# Patient Record
Sex: FEMALE | Race: WHITE | NOT HISPANIC OR LATINO | Employment: OTHER | URBAN - NONMETROPOLITAN AREA
[De-identification: names, ages, dates, MRNs, and addresses within clinical notes are randomized per-mention and may not be internally consistent; named-entity substitution may affect disease eponyms.]

---

## 2022-04-18 ENCOUNTER — OFFICE VISIT (OUTPATIENT)
Dept: FAMILY MEDICINE CLINIC | Facility: CLINIC | Age: 52
End: 2022-04-18

## 2022-04-18 VITALS
HEART RATE: 100 BPM | DIASTOLIC BLOOD PRESSURE: 75 MMHG | WEIGHT: 140.8 LBS | BODY MASS INDEX: 28.39 KG/M2 | HEIGHT: 59 IN | OXYGEN SATURATION: 99 % | SYSTOLIC BLOOD PRESSURE: 112 MMHG

## 2022-04-18 DIAGNOSIS — F33.0 MAJOR DEPRESSIVE DISORDER, RECURRENT, MILD: ICD-10-CM

## 2022-04-18 DIAGNOSIS — M25.551 RIGHT HIP PAIN: Primary | ICD-10-CM

## 2022-04-18 PROCEDURE — 99203 OFFICE O/P NEW LOW 30 MIN: CPT | Performed by: FAMILY MEDICINE

## 2022-04-18 RX ORDER — METHYLPREDNISOLONE 4 MG/1
4 TABLET ORAL DAILY
Qty: 7 TABLET | Refills: 0 | Status: SHIPPED | OUTPATIENT
Start: 2022-04-18 | End: 2022-09-19

## 2022-04-18 NOTE — PROGRESS NOTES
"Chief Complaint  Right hip pain (Fell out of bed a week ago)    Subjective          Agustina Taylor presents to [facility]  History of Present Illness    Objective   Vital Signs:   /75   Pulse 100   Ht 149.9 cm (59\")   Wt 63.9 kg (140 lb 12.8 oz)   SpO2 99%   BMI 28.44 kg/m²     Body mass index is 28.44 kg/m².    Review of Systems   Constitutional: Negative.    HENT: Negative for congestion, dental problem, ear discharge, ear pain and sore throat.    Respiratory: Negative for apnea, chest tightness and shortness of breath.    Gastrointestinal: Negative for constipation and nausea.   Endocrine: Negative for polyuria.   Genitourinary: Negative for difficulty urinating.   Musculoskeletal: Positive for arthralgias. Negative for gait problem.   Skin: Negative for rash.   Hematological: Negative for adenopathy.   Psychiatric/Behavioral: Positive for depressed mood and stress. The patient is nervous/anxious.        Past History:  Medical History: has a past medical history of Depression.   Surgical History: has no past surgical history on file.         Current Outpatient Medications:   •  Brexpiprazole (REXULTI PO), Take  by mouth., Disp: , Rfl:   •  LAMOTRIGINE PO, Take 100 mg by mouth Daily., Disp: , Rfl:   •  QUEtiapine Fumarate (SEROQUEL PO), Take  by mouth., Disp: , Rfl:   •  methylPREDNISolone (MEDROL) 4 MG tablet, Take 1 tablet by mouth Daily., Disp: 7 tablet, Rfl: 0    Allergies: Aspirin and Vraylar [cariprazine]    Physical Exam  Vitals reviewed.   Constitutional:       Appearance: Normal appearance.   HENT:      Head: Normocephalic and atraumatic.      Right Ear: Tympanic membrane, ear canal and external ear normal.      Left Ear: Tympanic membrane, ear canal and external ear normal.      Nose: Nose normal.      Mouth/Throat:      Mouth: Mucous membranes are moist.   Eyes:      Extraocular Movements: Extraocular movements intact.      Conjunctiva/sclera: Conjunctivae " normal.      Pupils: Pupils are equal, round, and reactive to light.   Cardiovascular:      Rate and Rhythm: Normal rate and regular rhythm.   Pulmonary:      Effort: Pulmonary effort is normal.      Breath sounds: Normal breath sounds.   Abdominal:      General: Abdomen is flat. Bowel sounds are normal.      Palpations: Abdomen is soft.   Musculoskeletal:         General: Normal range of motion.      Comments: Point tenderness over the right hip lateral range of motion and flexion of the hip is otherwise normal   Feet:      Right foot:      Protective Sensation: 0 sites tested. 0 sites sensed.      Toenail Condition: Right toenails are normal.      Left foot:      Protective Sensation: 0 sites tested. 0 sites sensed.      Toenail Condition: Left toenails are normal.   Skin:     General: Skin is warm and dry.   Neurological:      General: No focal deficit present.      Mental Status: She is alert and oriented to person, place, and time. Mental status is at baseline.   Psychiatric:         Behavior: Behavior normal.         Thought Content: Thought content normal.         Judgment: Judgment normal.          Result Review :                   Assessment and Plan    Diagnoses and all orders for this visit:    1. Right hip pain (Primary)  Comments:  We will get discussed the treatment ice rest and Medrol does not appear she has a fracture but offered x-ray which she does not want    2. Major depressive disorder, recurrent, mild (HCC)  Comments:  Continues on present medication states her son  2 years ago and she had a hard time with that.  Has been seeing psych    Other orders  -     methylPREDNISolone (MEDROL) 4 MG tablet; Take 1 tablet by mouth Daily.  Dispense: 7 tablet; Refill: 0              Follow Up   No follow-ups on file.  Patient was given instructions and counseling regarding her condition or for health maintenance advice. Please see specific information pulled into the AVS if appropriate.     Saturnino  MD Becky

## 2022-09-19 ENCOUNTER — OFFICE VISIT (OUTPATIENT)
Dept: FAMILY MEDICINE CLINIC | Facility: CLINIC | Age: 52
End: 2022-09-19

## 2022-09-19 VITALS
OXYGEN SATURATION: 98 % | BODY MASS INDEX: 29.72 KG/M2 | SYSTOLIC BLOOD PRESSURE: 160 MMHG | DIASTOLIC BLOOD PRESSURE: 100 MMHG | WEIGHT: 147.4 LBS | HEART RATE: 105 BPM | HEIGHT: 59 IN

## 2022-09-19 DIAGNOSIS — I73.9 PVD (PERIPHERAL VASCULAR DISEASE): ICD-10-CM

## 2022-09-19 DIAGNOSIS — I10 PRIMARY HYPERTENSION: Primary | ICD-10-CM

## 2022-09-19 PROCEDURE — 99214 OFFICE O/P EST MOD 30 MIN: CPT | Performed by: FAMILY MEDICINE

## 2022-09-19 RX ORDER — HYDROXYZINE PAMOATE 50 MG/1
50 CAPSULE ORAL 3 TIMES DAILY PRN
COMMUNITY
Start: 2022-08-21

## 2022-09-19 RX ORDER — VALSARTAN 160 MG/1
160 TABLET ORAL DAILY
Qty: 30 TABLET | Refills: 2 | Status: SHIPPED | OUTPATIENT
Start: 2022-09-19 | End: 2022-10-10

## 2022-09-19 RX ORDER — MIRTAZAPINE 15 MG/1
15 TABLET, FILM COATED ORAL NIGHTLY
COMMUNITY

## 2022-09-19 RX ORDER — BUSPIRONE HYDROCHLORIDE 30 MG/1
30 TABLET ORAL 2 TIMES DAILY
COMMUNITY
Start: 2022-09-09

## 2022-09-19 RX ORDER — CYCLOBENZAPRINE HCL 10 MG
10 TABLET ORAL 3 TIMES DAILY PRN
COMMUNITY
Start: 2022-08-12

## 2022-09-19 RX ORDER — LAMOTRIGINE 200 MG/1
200 TABLET ORAL 2 TIMES DAILY
COMMUNITY
Start: 2022-09-09

## 2022-10-10 ENCOUNTER — OFFICE VISIT (OUTPATIENT)
Dept: FAMILY MEDICINE CLINIC | Facility: CLINIC | Age: 52
End: 2022-10-10

## 2022-10-10 VITALS
OXYGEN SATURATION: 99 % | HEIGHT: 59 IN | SYSTOLIC BLOOD PRESSURE: 160 MMHG | BODY MASS INDEX: 31.27 KG/M2 | HEART RATE: 88 BPM | DIASTOLIC BLOOD PRESSURE: 95 MMHG | WEIGHT: 155.1 LBS

## 2022-10-10 DIAGNOSIS — I10 PRIMARY HYPERTENSION: Primary | ICD-10-CM

## 2022-10-10 PROCEDURE — 99213 OFFICE O/P EST LOW 20 MIN: CPT | Performed by: FAMILY MEDICINE

## 2022-10-10 RX ORDER — VALSARTAN 320 MG/1
320 TABLET ORAL DAILY
Qty: 30 TABLET | Refills: 3 | Status: SHIPPED | OUTPATIENT
Start: 2022-10-10 | End: 2022-11-08 | Stop reason: SDUPTHER

## 2022-10-10 NOTE — PROGRESS NOTES
"Chief Complaint  Hypertension    Subjective          Agustina Taylor presents to [facility]  History of Present Illness  Patient comes in says her blood pressure still been running high at home she has been monitoring and she says been get between 160 and 200 at home on the top she says the bottom numbers been running about  she says she has had no problem with medications continues taking it denies any activities at this time  Hypertension  Pertinent negatives include no shortness of breath.       Objective   Vital Signs:   /95   Pulse 88   Ht 149.9 cm (59.02\")   Wt 70.4 kg (155 lb 1.6 oz)   SpO2 99%   BMI 31.31 kg/m²     Body mass index is 31.31 kg/m².    Review of Systems   Constitutional: Negative.    HENT: Negative for congestion, dental problem, ear discharge, ear pain and sore throat.    Respiratory: Negative for apnea, chest tightness and shortness of breath.    Gastrointestinal: Negative for constipation and nausea.   Endocrine: Negative for polyuria.   Genitourinary: Negative for difficulty urinating.   Musculoskeletal: Positive for back pain. Negative for arthralgias and gait problem.   Skin: Negative for rash.   Hematological: Negative for adenopathy.       Past History:  Medical History: has a past medical history of Depression.   Surgical History: has no past surgical history on file.         Current Outpatient Medications:   •  Brexpiprazole (REXULTI PO), Take  by mouth., Disp: , Rfl:   •  busPIRone (BUSPAR) 30 MG tablet, Take 30 mg by mouth 2 (Two) Times a Day., Disp: , Rfl:   •  cyclobenzaprine (FLEXERIL) 10 MG tablet, Take 10 mg by mouth 3 (Three) Times a Day As Needed. for muscle spams, Disp: , Rfl:   •  hydrOXYzine pamoate (VISTARIL) 50 MG capsule, Take 50 mg by mouth 3 (Three) Times a Day As Needed., Disp: , Rfl:   •  lamoTRIgine (LaMICtal) 200 MG tablet, Take 200 mg by mouth 2 (Two) Times a Day., Disp: , Rfl:   •  mirtazapine (REMERON) 15 MG " tablet, Take 15 mg by mouth Every Night., Disp: , Rfl:   •  valsartan (DIOVAN) 320 MG tablet, Take 1 tablet by mouth Daily., Disp: 30 tablet, Rfl: 3    Allergies: Aspirin and Vraylar [cariprazine]    Physical Exam  Vitals reviewed.   Constitutional:       Appearance: Normal appearance.   HENT:      Head: Normocephalic and atraumatic.      Right Ear: Tympanic membrane, ear canal and external ear normal.      Left Ear: Tympanic membrane, ear canal and external ear normal.      Nose: Nose normal.      Mouth/Throat:      Mouth: Mucous membranes are moist.   Eyes:      Extraocular Movements: Extraocular movements intact.      Conjunctiva/sclera: Conjunctivae normal.      Pupils: Pupils are equal, round, and reactive to light.   Cardiovascular:      Rate and Rhythm: Normal rate and regular rhythm.   Pulmonary:      Effort: Pulmonary effort is normal.      Breath sounds: Normal breath sounds.   Abdominal:      General: Abdomen is flat. Bowel sounds are normal.      Palpations: Abdomen is soft.   Musculoskeletal:         General: Normal range of motion.   Feet:      Right foot:      Protective Sensation: 0 sites tested. 0 sites sensed.      Toenail Condition: Right toenails are normal.      Left foot:      Protective Sensation: 0 sites tested. 0 sites sensed.      Toenail Condition: Left toenails are normal.   Skin:     General: Skin is warm and dry.   Neurological:      General: No focal deficit present.      Mental Status: She is alert and oriented to person, place, and time. Mental status is at baseline.   Psychiatric:         Behavior: Behavior normal.         Thought Content: Thought content normal.         Judgment: Judgment normal.          Result Review :                   Assessment and Plan    Diagnoses and all orders for this visit:    1. Primary hypertension (Primary)  Comments:  Recheck blood pressure was 155/90 patient says it been running between about 150 and about 200 at home on the top she says its not  come down she says but she is    Other orders  -     valsartan (DIOVAN) 320 MG tablet; Take 1 tablet by mouth Daily.  Dispense: 30 tablet; Refill: 3              Follow Up   Return in about 4 weeks (around 11/7/2022).  Patient was given instructions and counseling regarding her condition or for health maintenance advice. Please see specific information pulled into the AVS if appropriate.     Saturnino Pena MD

## 2022-10-24 ENCOUNTER — OFFICE VISIT (OUTPATIENT)
Dept: CARDIOLOGY | Facility: CLINIC | Age: 52
End: 2022-10-24

## 2022-10-24 VITALS
SYSTOLIC BLOOD PRESSURE: 130 MMHG | HEART RATE: 78 BPM | RESPIRATION RATE: 18 BRPM | BODY MASS INDEX: 30.04 KG/M2 | OXYGEN SATURATION: 98 % | WEIGHT: 149 LBS | DIASTOLIC BLOOD PRESSURE: 74 MMHG | HEIGHT: 59 IN | TEMPERATURE: 97.7 F

## 2022-10-24 DIAGNOSIS — I73.9 INTERMITTENT CLAUDICATION: ICD-10-CM

## 2022-10-24 DIAGNOSIS — Z86.73 HISTORY OF STROKE: ICD-10-CM

## 2022-10-24 DIAGNOSIS — R42 DIZZINESS: ICD-10-CM

## 2022-10-24 DIAGNOSIS — Z72.0 TOBACCO USE: ICD-10-CM

## 2022-10-24 DIAGNOSIS — I10 ESSENTIAL HYPERTENSION: Primary | ICD-10-CM

## 2022-10-24 DIAGNOSIS — I65.29 STENOSIS OF CAROTID ARTERY, UNSPECIFIED LATERALITY: ICD-10-CM

## 2022-10-24 PROCEDURE — 93000 ELECTROCARDIOGRAM COMPLETE: CPT

## 2022-10-24 PROCEDURE — 99204 OFFICE O/P NEW MOD 45 MIN: CPT

## 2022-10-24 RX ORDER — CLOPIDOGREL BISULFATE 75 MG/1
75 TABLET ORAL DAILY
Qty: 90 TABLET | Refills: 0 | Status: SHIPPED | OUTPATIENT
Start: 2022-10-24 | End: 2023-01-16 | Stop reason: SDUPTHER

## 2022-10-24 RX ORDER — PANTOPRAZOLE SODIUM 40 MG/1
40 TABLET, DELAYED RELEASE ORAL DAILY
COMMUNITY

## 2022-10-24 RX ORDER — TRAZODONE HYDROCHLORIDE 50 MG/1
50 TABLET ORAL NIGHTLY
COMMUNITY

## 2022-10-24 NOTE — PROGRESS NOTES
"[facility]  [facility]OLOG[facility]  [address] [place] [address]  Dept: [phone]  Dept Fax: [phone]    Agustina Taylor  1970    New Patient Office Note    History of Present Illness:  Agustina Taylor is a 52 y.o. female who presents to the clinic for Establish Care and Dizziness. She has PMH significant for Stroke 2012, carotid stenosis, HTN. She is single, 2 kids one , disabled. She is current every day smoker 1-2 ppd x 25 yrs, denies alcohol, hx IVDA sober now 2 yrs. Rare caffeine use. Regular diet, denies exercise. Negative family history. She presents today as self referral to establish care and for recent 3 week history of dizziness \"off balance feeling\" primarily with moving, standing, also sometimes at rest, but states if when standing improves with resting. Also has had constant headache, recent hypertension diagnosis, was started on Valsartan Sept and increased to 360 mg qd recently last 2 weeks, BP today still elevated 140/82 right arm and 140/80 left arm, negative orthostatics 138/70 standing. Ekg today NSR, Hr 73, nonspecific ST abnormalities, no change from . She has stroke hx, she states around  effecting right side, unknown etiology per patient she states in [facility], [facility] try to retrieve record. She denies c/o CP, SOB, fatigue, palpitations, orthopnea, PND, + PND, + snoring and no formal sleep study. Additionally she states her legs do hurt, cramp at night, also cramping/pain while walking which improves with rest, She is smoker x 25 yrs. PE today reveals bilateral mild carotid bruit, trace LE, mildly elevated BP. Will gets labs advised to come fasting this week, Echo for recent HTN dx, VELIA for claudication, smoker and know WILLIAMS, also carotid duplex ordered by PCP but not yet scheduled, advised to keep that appointment. Will start Plavix for WILLIAMS, as she is allergic to ASA. Advised to keep home BP log included in AVS, low salt " "diet. Will follow 3 weeks or sooner with new, worsening complaints.     The following portions of the patient's history were reviewed and updated as appropriate: allergies, current medications, past family history, past medical history, past social history, past surgical history, and problem list.    Medications:  busPIRone  cyclobenzaprine  hydrOXYzine pamoate  lamoTRIgine  mirtazapine  pantoprazole  REXULTI PO  traZODone  valsartan    Subjective  Allergies   Allergen Reactions   • Aspirin Anaphylaxis   • Vraylar [Cariprazine] Hives        Past Medical History:   Diagnosis Date   • Depression        History reviewed. No pertinent surgical history.    History reviewed. No pertinent family history.     Social History     Socioeconomic History   • Marital status: Single   Tobacco Use   • Smoking status: Every Day   • Smokeless tobacco: Never   Vaping Use   • Vaping Use: Never used   Substance and Sexual Activity   • Alcohol use: Defer   • Drug use: Defer     Comment: recovering   • Sexual activity: Defer       Review of Systems   Neurological: Positive for dizziness, light-headedness and headache.   All other systems reviewed and are negative.      Cardiovascular Procedures    ECHO/MUGA:   STRESS TESTS:   CARDIAC CATH:   DEVICES:   HOLTER:   CT/MRI:   VASCULAR:   CARDIOTHORACIC:     Objective  Vitals:    10/24/22 1252   BP: 130/74   BP Location: Right leg   Patient Position: Sitting   Cuff Size: Adult   Pulse: 78   Resp: 18   Temp: 97.7 °F (36.5 °C)   TempSrc: Infrared   SpO2: 98%   Weight: 67.6 kg (149 lb)   Height: 149.9 cm (59\")   PainSc: 0-No pain       Physical Exam  Constitutional:       Appearance: Healthy appearance. Not in distress.   Neck:      Vascular: No JVR. JVD normal.   Pulmonary:      Effort: Pulmonary effort is normal.      Breath sounds: Normal breath sounds. No wheezing. No rhonchi. No rales.   Chest:      Chest wall: Not tender to palpatation.   Cardiovascular:      PMI at left midclavicular line. " Normal rate. Regular rhythm. Normal S1. Normal S2.      Murmurs: There is no murmur.      No gallop. No click. No rub.   Pulses:     Carotid: with bruit bilaterally.  Edema:     Pretibial: bilateral trace edema of the pretibial area.     Ankle: bilateral trace edema of the ankle.     Feet: bilateral trace edema of the feet.  Abdominal:      General: Bowel sounds are normal.      Palpations: Abdomen is soft.      Tenderness: There is no abdominal tenderness.   Musculoskeletal: Normal range of motion.         General: No tenderness. Skin:     General: Skin is warm and dry.   Neurological:      General: No focal deficit present.      Mental Status: Alert and oriented to person, place and time.          Diagnostic Data    ECG 12 Lead    Date/Time: 10/24/2022 2:14 PM  Performed by: Lynn Ridley APRN  Authorized by: Lynn Ridley APRN   Comparison: compared with previous ECG from 3/23/2021  Similar to previous ECG  Rhythm: sinus rhythm  Rate: normal  BPM: 73  QRS axis: normal  Other findings: non-specific ST-T wave changes    Clinical impression: non-specific ECG            Assessment and Plan  Diagnoses and all orders for this visit:    1. Essential hypertension (Primary)  On Valsartan 360 mg qd, increased by PCP about 2 weeks ago, BP today still mild elevation 140/82 on recheck. + HA, dizziness may be attributed to BP. Will observe, advised to keep BP log and may need additional Bp lowering therapy at follow up visit.Hx IVDA, no longer using last 2 yrs. Labs, echo for further evaluation.    -     Adult Transthoracic Echo Complete W/ Cont if Necessary Per Protocol; Future  -     CBC & Differential; Future  -     Comprehensive Metabolic Panel; Future  -     ECG 12 Lead    2. Dizziness  As above. Will get labs, carotid duplex, BP control.   -     Adult Transthoracic Echo Complete W/ Cont if Necessary Per Protocol; Future  -     CBC & Differential; Future  -     Comprehensive Metabolic Panel;  Future  -     ECG 12 Lead    3. Stenosis of carotid artery, unspecified laterality  < 50% bilateral with normal antegrade flow by duplex 9-9-2020. Bilateral bruit. Advised to keep carotid duplex ordered by PCP. No ASA, allergic, will order Plavix 75 mg qd.   -     Lipid Panel; Future  -     CK; Future    4. Tobacco use  Current every day smoker. 1-2 ppd x 25 yrs, has tried Chantix in past but interfered with some other medications. Advised to quit. She is not agreeable at this time.     5. Intermittent claudication (HCC)  C/o claudication bilateral extremities with activity, also has some cramping night time. Smoker, will get VELIA.   -     Doppler Ankle Brachial Index Single Level CAR; Future    6. History of stroke  She states 2012, no records, tried to call [place] for record but they state none there. Unknown etiology. Will try to find records. Start Plavix, also will need to start Statin, needing labs first.           Return in about 3 weeks (around 11/14/2022) for Recheck.    Lynn Ridley, APRN  10/24/2022

## 2022-10-28 ENCOUNTER — LAB (OUTPATIENT)
Dept: CARDIOLOGY | Facility: CLINIC | Age: 52
End: 2022-10-28

## 2022-10-28 DIAGNOSIS — I65.29 STENOSIS OF CAROTID ARTERY, UNSPECIFIED LATERALITY: ICD-10-CM

## 2022-10-28 DIAGNOSIS — I10 ESSENTIAL HYPERTENSION: Primary | ICD-10-CM

## 2022-10-28 DIAGNOSIS — Z72.0 TOBACCO USE: ICD-10-CM

## 2022-10-28 DIAGNOSIS — I73.9 INTERMITTENT CLAUDICATION: ICD-10-CM

## 2022-10-28 DIAGNOSIS — Z86.73 HISTORY OF STROKE: ICD-10-CM

## 2022-10-28 DIAGNOSIS — R42 DIZZINESS: ICD-10-CM

## 2022-10-29 LAB
ALBUMIN SERPL-MCNC: 4.9 G/DL (ref 3.8–4.9)
ALBUMIN/GLOB SERPL: 1.6 {RATIO} (ref 1.2–2.2)
ALP SERPL-CCNC: 123 IU/L (ref 44–121)
ALT SERPL-CCNC: 28 IU/L (ref 0–32)
AST SERPL-CCNC: 21 IU/L (ref 0–40)
BASOPHILS # BLD AUTO: 0.1 X10E3/UL (ref 0–0.2)
BASOPHILS NFR BLD AUTO: 1 %
BILIRUB SERPL-MCNC: 0.4 MG/DL (ref 0–1.2)
BUN SERPL-MCNC: 19 MG/DL (ref 6–24)
BUN/CREAT SERPL: 16 (ref 9–23)
CALCIUM SERPL-MCNC: 10 MG/DL (ref 8.7–10.2)
CHLORIDE SERPL-SCNC: 100 MMOL/L (ref 96–106)
CHOLEST SERPL-MCNC: 209 MG/DL (ref 100–199)
CK SERPL-CCNC: 60 U/L (ref 32–182)
CO2 SERPL-SCNC: 23 MMOL/L (ref 20–29)
CREAT SERPL-MCNC: 1.17 MG/DL (ref 0.57–1)
EGFRCR SERPLBLD CKD-EPI 2021: 55 ML/MIN/1.73
EOSINOPHIL # BLD AUTO: 0.2 X10E3/UL (ref 0–0.4)
EOSINOPHIL NFR BLD AUTO: 2 %
ERYTHROCYTE [DISTWIDTH] IN BLOOD BY AUTOMATED COUNT: 13.8 % (ref 11.7–15.4)
GLOBULIN SER CALC-MCNC: 3.1 G/DL (ref 1.5–4.5)
GLUCOSE SERPL-MCNC: 99 MG/DL (ref 70–99)
HCT VFR BLD AUTO: 43 % (ref 34–46.6)
HDLC SERPL-MCNC: 41 MG/DL
HGB BLD-MCNC: 14.2 G/DL (ref 11.1–15.9)
IMM GRANULOCYTES # BLD AUTO: 0 X10E3/UL (ref 0–0.1)
IMM GRANULOCYTES NFR BLD AUTO: 0 %
LDLC SERPL CALC-MCNC: 136 MG/DL (ref 0–99)
LYMPHOCYTES # BLD AUTO: 3.7 X10E3/UL (ref 0.7–3.1)
LYMPHOCYTES NFR BLD AUTO: 44 %
MCH RBC QN AUTO: 28.4 PG (ref 26.6–33)
MCHC RBC AUTO-ENTMCNC: 33 G/DL (ref 31.5–35.7)
MCV RBC AUTO: 86 FL (ref 79–97)
MONOCYTES # BLD AUTO: 0.7 X10E3/UL (ref 0.1–0.9)
MONOCYTES NFR BLD AUTO: 8 %
NEUTROPHILS # BLD AUTO: 3.8 X10E3/UL (ref 1.4–7)
NEUTROPHILS NFR BLD AUTO: 45 %
PLATELET # BLD AUTO: 431 X10E3/UL (ref 150–450)
POTASSIUM SERPL-SCNC: 4.4 MMOL/L (ref 3.5–5.2)
PROT SERPL-MCNC: 8 G/DL (ref 6–8.5)
RBC # BLD AUTO: 5 X10E6/UL (ref 3.77–5.28)
SODIUM SERPL-SCNC: 138 MMOL/L (ref 134–144)
TRIGL SERPL-MCNC: 178 MG/DL (ref 0–149)
VLDLC SERPL CALC-MCNC: 32 MG/DL (ref 5–40)
WBC # BLD AUTO: 8.5 X10E3/UL (ref 3.4–10.8)

## 2022-11-01 ENCOUNTER — TELEPHONE (OUTPATIENT)
Dept: CARDIOLOGY | Facility: CLINIC | Age: 52
End: 2022-11-01

## 2022-11-01 NOTE — TELEPHONE ENCOUNTER
----- Message from LISSY Melo sent at 10/31/2022  9:37 AM EDT -----  Her cholesterol is elevated  and her ASCVD 10 yr risk score is 10 %. We need to start her on statin therapy. I would recommend Crestor 20 mg qd to start. We can send to her pharmacy. Please let her know in general this medication has the least amount of side effects but if her urine turns dark brown, she has stomach pain, vomiting, with extreme muscle cramping then she should let me know.     Her kidney function is only very mildly elevated. We will continue to watch this. Tell her to make sure she is hydrating well with water.     Looks like she is scheduled for testing tomorrow. I will se her in a couple weeks.

## 2022-11-01 NOTE — TELEPHONE ENCOUNTER
TC to pt and she verbally understood message regarding results and is agreeable to start the Statin therapy and will keep us updated after starting.

## 2022-11-04 ENCOUNTER — PATIENT ROUNDING (BHMG ONLY) (OUTPATIENT)
Dept: CARDIOLOGY | Facility: CLINIC | Age: 52
End: 2022-11-04

## 2022-11-04 NOTE — PROGRESS NOTES
November 4, 2022    Hello, may I speak with Agustina Taylor?    My name is nano    I am  with [facility]  [facility]IOLOG[facility]  [address] [place] [address].    Before we get started may I verify your date of birth? 1970    I am calling to officially welcome you to our practice and ask about your recent visit. Is this a good time to talk? yes    Tell me about your visit with us. What things went well?  everything went well  Very nice people        We're always looking for ways to make our patients' experiences even better. Do you have recommendations on ways we may improve?  no    Overall were you satisfied with your first visit to our practice? yes       I appreciate you taking the time to speak with me today. Is there anything else I can do for you? no      Thank you, and have a great day.

## 2022-11-08 ENCOUNTER — OFFICE VISIT (OUTPATIENT)
Dept: FAMILY MEDICINE CLINIC | Facility: CLINIC | Age: 52
End: 2022-11-08

## 2022-11-08 VITALS
HEIGHT: 59 IN | SYSTOLIC BLOOD PRESSURE: 124 MMHG | WEIGHT: 148 LBS | HEART RATE: 68 BPM | DIASTOLIC BLOOD PRESSURE: 82 MMHG | OXYGEN SATURATION: 97 % | BODY MASS INDEX: 29.84 KG/M2

## 2022-11-08 DIAGNOSIS — F33.0 MAJOR DEPRESSIVE DISORDER, RECURRENT, MILD: ICD-10-CM

## 2022-11-08 DIAGNOSIS — I10 PRIMARY HYPERTENSION: Primary | ICD-10-CM

## 2022-11-08 PROCEDURE — 99213 OFFICE O/P EST LOW 20 MIN: CPT | Performed by: FAMILY MEDICINE

## 2022-11-08 RX ORDER — VALSARTAN 320 MG/1
320 TABLET ORAL DAILY
Qty: 30 TABLET | Refills: 3 | Status: SHIPPED | OUTPATIENT
Start: 2022-11-08 | End: 2023-02-13

## 2022-11-08 NOTE — PROGRESS NOTES
"Chief Complaint  Hypertension (Patient is here for a follow up.)    Subjective          Agustina Taylor presents to [facility]  History of Present Illness  Patient comes in today saying she has been doing okay her blood pressures been much improved with the new medications and says she has had no real problems or difficulties she said her anxiety depression is still somewhat of a problem but she says it is better  Hypertension  Pertinent negatives include no shortness of breath.       Objective   Vital Signs:   /82 (BP Location: Right arm, Patient Position: Sitting, Cuff Size: Adult)   Pulse 68   Ht 149.9 cm (59\")   Wt 67.1 kg (148 lb)   SpO2 97%   BMI 29.89 kg/m²     Body mass index is 29.89 kg/m².    Review of Systems   Constitutional: Negative.    HENT: Negative for congestion, dental problem, ear discharge, ear pain and sore throat.    Respiratory: Negative for apnea, chest tightness and shortness of breath.    Gastrointestinal: Negative for constipation and nausea.   Endocrine: Negative for polyuria.   Genitourinary: Negative for difficulty urinating.   Musculoskeletal: Negative for arthralgias and gait problem.   Skin: Negative for rash.   Hematological: Negative for adenopathy.   Psychiatric/Behavioral: Positive for depressed mood. The patient is nervous/anxious.        Past History:  Medical History: has a past medical history of Depression and Hypertension.   Surgical History: has no past surgical history on file.         Current Outpatient Medications:   •  Brexpiprazole (REXULTI PO), Take  by mouth., Disp: , Rfl:   •  busPIRone (BUSPAR) 30 MG tablet, Take 30 mg by mouth 2 (Two) Times a Day., Disp: , Rfl:   •  clopidogrel (Plavix) 75 MG tablet, Take 1 tablet by mouth Daily., Disp: 90 tablet, Rfl: 0  •  hydrOXYzine pamoate (VISTARIL) 50 MG capsule, Take 50 mg by mouth 3 (Three) Times a Day As Needed., Disp: , Rfl:   •  lamoTRIgine (LaMICtal) 200 MG tablet, Take 200 " mg by mouth 2 (Two) Times a Day., Disp: , Rfl:   •  mirtazapine (REMERON) 15 MG tablet, Take 15 mg by mouth Every Night., Disp: , Rfl:   •  pantoprazole (PROTONIX) 40 MG EC tablet, Take 1 tablet by mouth Daily., Disp: , Rfl:   •  traZODone (DESYREL) 50 MG tablet, Take 1 tablet by mouth Every Night., Disp: , Rfl:   •  valsartan (DIOVAN) 320 MG tablet, Take 1 tablet by mouth Daily., Disp: 30 tablet, Rfl: 3  •  cyclobenzaprine (FLEXERIL) 10 MG tablet, Take 10 mg by mouth 3 (Three) Times a Day As Needed. for muscle spams, Disp: , Rfl:     Allergies: Aspirin and Vraylar [cariprazine]    Physical Exam  Vitals reviewed.   Constitutional:       Appearance: Normal appearance.   HENT:      Head: Normocephalic and atraumatic.      Right Ear: Tympanic membrane, ear canal and external ear normal.      Left Ear: Tympanic membrane, ear canal and external ear normal.      Nose: Nose normal.      Mouth/Throat:      Mouth: Mucous membranes are moist.   Eyes:      Extraocular Movements: Extraocular movements intact.      Conjunctiva/sclera: Conjunctivae normal.      Pupils: Pupils are equal, round, and reactive to light.   Cardiovascular:      Rate and Rhythm: Normal rate and regular rhythm.   Pulmonary:      Effort: Pulmonary effort is normal.      Breath sounds: Normal breath sounds.   Abdominal:      General: Abdomen is flat. Bowel sounds are normal.      Palpations: Abdomen is soft.   Musculoskeletal:         General: Normal range of motion.   Feet:      Right foot:      Protective Sensation: 0 sites tested. 0 sites sensed.      Toenail Condition: Right toenails are normal.      Left foot:      Protective Sensation: 0 sites tested. 0 sites sensed.      Toenail Condition: Left toenails are normal.   Skin:     General: Skin is warm and dry.   Neurological:      General: No focal deficit present.      Mental Status: She is alert and oriented to person, place, and time. Mental status is at baseline.   Psychiatric:         Behavior:  Behavior normal.         Thought Content: Thought content normal.         Judgment: Judgment normal.          Result Review :                   Assessment and Plan    Diagnoses and all orders for this visit:    1. Primary hypertension (Primary)  Comments:  Blood pressure much better recheck today was 142/78 continue medications and recheck in 6 months    2. Major depressive disorder, recurrent, mild (HCC)  Comments:  Stable still seeing therapist monitor    Other orders  -     valsartan (DIOVAN) 320 MG tablet; Take 1 tablet by mouth Daily.  Dispense: 30 tablet; Refill: 3              Follow Up   Return in about 6 months (around 5/8/2023).  Patient was given instructions and counseling regarding her condition or for health maintenance advice. Please see specific information pulled into the AVS if appropriate.     Saturnino Pena MD

## 2022-11-14 ENCOUNTER — OFFICE VISIT (OUTPATIENT)
Dept: CARDIOLOGY | Facility: CLINIC | Age: 52
End: 2022-11-14

## 2022-11-14 VITALS
RESPIRATION RATE: 19 BRPM | WEIGHT: 142 LBS | HEART RATE: 55 BPM | SYSTOLIC BLOOD PRESSURE: 126 MMHG | TEMPERATURE: 98 F | DIASTOLIC BLOOD PRESSURE: 76 MMHG | BODY MASS INDEX: 28.63 KG/M2 | OXYGEN SATURATION: 98 % | HEIGHT: 59 IN

## 2022-11-14 DIAGNOSIS — Z86.73 HISTORY OF STROKE: ICD-10-CM

## 2022-11-14 DIAGNOSIS — R42 DIZZINESS: Primary | ICD-10-CM

## 2022-11-14 DIAGNOSIS — I65.23 BILATERAL CAROTID ARTERY STENOSIS: ICD-10-CM

## 2022-11-14 DIAGNOSIS — Z91.89 AT RISK FOR SLEEP APNEA: ICD-10-CM

## 2022-11-14 DIAGNOSIS — I10 ESSENTIAL HYPERTENSION: ICD-10-CM

## 2022-11-14 DIAGNOSIS — I73.9 INTERMITTENT CLAUDICATION: ICD-10-CM

## 2022-11-14 DIAGNOSIS — Z72.0 TOBACCO USE: ICD-10-CM

## 2022-11-14 PROCEDURE — 99214 OFFICE O/P EST MOD 30 MIN: CPT

## 2022-11-14 RX ORDER — AMLODIPINE BESYLATE 5 MG/1
5 TABLET ORAL DAILY
Qty: 90 TABLET | Refills: 1 | Status: SHIPPED | OUTPATIENT
Start: 2022-11-14 | End: 2023-01-16 | Stop reason: SDUPTHER

## 2022-11-14 NOTE — PROGRESS NOTES
[facility] [place]  [facility] CARDIOLOGY  [address] [place] [address]  Dept: [phone]  Dept Fax: [phone]    Agustina Taylor  1970    Follow Up Office Visit Note    History of Present Illness  Agustina Taylor is a 52 y.o. female who presents to the clinic for Follow-up and Hypertension. Today she denies improvement in her dizziness, also still having headaches. She denies all other complaints today, no CP,SOB, LE edema. HTN on Valsartan 360 mg qd, states compliance, BP today is still elevated 142/80 on recheck and consistent with home log, echo showed moderate LVH. Today will add Norvasc 5 mg qd, advised to continue monitoring BP at home, low salt diet and to hydrate well.  Echo normal Ef 56-60 %, mild-moderate LVH, LV mass index increased, grade I diastolic dysfunction, elevated RVSP 35-45 with trivial AI, somewhat limited. VELIA normal right, left borderline abnormality 0.8 at rest, 1.0 after walking. Her complaints are primarily in left, however mostly at night time now, only minor complaint with walking, advised to start statin, continue plavix.HLP, sent Crestor 20 but she did not , , ASCVD 10 yr risk is 10 %, WILLIAMS, HTN, advised she start this medication.  She has still not had her carotid duplex, scheduled this week, on Plavix and denies bleeding. Advised to make that appointment. Still smoking, would like to try Chantix to quit. Additionally we discussed again sending for sleep study, RVSP was elevated 34-45, HTN and sleep study was recommended, she would like to wait on this for now. PE today seems normal, peripheral pulses 2+ today. Will follow 2 months or sooner as indicated.     The following portions of the patient's history were reviewed and updated as appropriate: allergies, current medications, past family history, past medical history, past social history, past surgical history, and problem  "list.    Medications:  amLODIPine  busPIRone  clopidogrel  cyclobenzaprine  hydrOXYzine pamoate  lamoTRIgine  mirtazapine  pantoprazole  REXULTI PO  traZODone  valsartan  Varenicline Tartrate (Starter) tablet therapy pack    Subjective  Allergies   Allergen Reactions   • Aspirin Anaphylaxis   • Vraylar [Cariprazine] Hives        Past Medical History:   Diagnosis Date   • Depression    • Hypertension        History reviewed. No pertinent surgical history.    Family History   Problem Relation Age of Onset   • Hypertension Mother    • Hyperlipidemia Father    • Hypertension Father         Social History     Socioeconomic History   • Marital status: Single   Tobacco Use   • Smoking status: Every Day     Packs/day: 0.50     Years: 31.00     Pack years: 15.50     Types: Cigarettes   • Smokeless tobacco: Never   Vaping Use   • Vaping Use: Never used   Substance and Sexual Activity   • Alcohol use: Not Currently   • Drug use: Not Currently     Types: Methamphetamines, Marijuana     Comment: recovering   • Sexual activity: Defer       Review of Systems   Musculoskeletal: Positive for arthralgias.   Neurological: Positive for dizziness and headache.   All other systems reviewed and are negative.      Cardiovascular Procedures    ECHO/MUGA:   STRESS TESTS:   CARDIAC CATH:   DEVICES:   HOLTER:   CT/MRI:   VASCULAR:   CARDIOTHORACIC:     Objective  Vitals:    11/14/22 1308   BP: 126/76   BP Location: Right arm   Patient Position: Sitting   Cuff Size: Adult   Pulse: 55   Resp: 19   Temp: 98 °F (36.7 °C)   TempSrc: Infrared   SpO2: 98%   Weight: 64.4 kg (142 lb)   Height: 149.9 cm (59\")   PainSc: 0-No pain     Body mass index is 28.68 kg/m².     Physical Exam  Constitutional:       Appearance: Healthy appearance. Not in distress.   Neck:      Vascular: No JVR. JVD normal.   Pulmonary:      Effort: Pulmonary effort is normal.      Breath sounds: Normal breath sounds. No wheezing. No rhonchi. No rales.   Chest:      Chest wall: Not " tender to palpatation.   Cardiovascular:      PMI at left midclavicular line. Bradycardia present. Regular rhythm. Normal S1. Normal S2.      Murmurs: There is a grade 2/6 systolic murmur at the URSB.      No gallop. No click. No rub.   Pulses:     Carotid: with bruit bilaterally.     Dorsalis pedis: 2+ bilaterally.     Posterior tibial: 2+ bilaterally.  Edema:     Peripheral edema absent.   Abdominal:      General: Bowel sounds are normal.      Palpations: Abdomen is soft.      Tenderness: There is no abdominal tenderness.   Musculoskeletal: Normal range of motion.         General: No tenderness. Skin:     General: Skin is warm and dry.   Neurological:      General: No focal deficit present.      Mental Status: Alert and oriented to person, place and time.          Diagnostic Data  Procedures    Assessment and Plan  Diagnoses and all orders for this visit:    1. Dizziness (Primary)  No improvement, notices at rest and with activity, sounds positional in nature. BP still elevated, we need to better control this.     2. Intermittent claudication (HCC)  VELIA normal on left, right mild abnormality, 0.8 rest and 1.0 after walking. On Plavix, advised to start statin. Will continue to monitor as today her complaints seem to correlate more with nocturnal cramping, she denies major complaints with walking, pulses ok. Advised to quit smoking    3. Essential hypertension  Still elevated today 142/80 on recheck and congruent with home readings, on Valsartan 360 mg qd, states compliance. Will add Norvasc 5 mg qd. Advised to monitor BP at home.   -     amLODIPine (NORVASC) 5 MG tablet; Take 1 tablet by mouth Daily.  Dispense: 90 tablet; Refill: 1    4. Bilateral carotid artery stenosis  No record, + bilateral bruit noted. Will have carotid duplex this week. On Plavix, advised to start statin.     5. History of stroke  2012, right sided. No symptoms, on Plavix, denies bleeding.     6. Tobacco use  -     Varenicline Tartrate,  Starter, 0.5 MG X 11 & 1 MG X 42 tablet therapy pack; Take 0.5 mg by mouth Daily for 3 days, THEN 0.5 mg 2 (Two) Times a Day for 4 days, THEN 1 mg 2 (Two) Times a Day for 60 days. Day 1-3 0.5 mg once daily  Day 4-7 0.5 mg 2 times daily  Day 8 to end of treatment 1 mg 2 times daily  Dispense: 53 each; Refill: 2    7. High risk for JAMES  + snoring, BMI 28, RVSP elevated 35-45 on echo. Advised to see sleep medicine. She is not agreeable at this time.        Return in about 2 months (around 1/14/2023) for Recheck.    Lynn Ridley, APRN  11/14/2022

## 2022-12-17 DIAGNOSIS — I10 PRIMARY HYPERTENSION: ICD-10-CM

## 2022-12-17 RX ORDER — VALSARTAN 160 MG/1
160 TABLET ORAL DAILY
Qty: 30 TABLET | Refills: 2 | OUTPATIENT
Start: 2022-12-17

## 2023-01-16 ENCOUNTER — OFFICE VISIT (OUTPATIENT)
Dept: CARDIOLOGY | Facility: CLINIC | Age: 53
End: 2023-01-16
Payer: COMMERCIAL

## 2023-01-16 VITALS
OXYGEN SATURATION: 99 % | TEMPERATURE: 98 F | SYSTOLIC BLOOD PRESSURE: 118 MMHG | DIASTOLIC BLOOD PRESSURE: 74 MMHG | RESPIRATION RATE: 16 BRPM | WEIGHT: 147 LBS | HEIGHT: 59 IN | HEART RATE: 76 BPM | BODY MASS INDEX: 29.64 KG/M2

## 2023-01-16 DIAGNOSIS — I65.29 STENOSIS OF CAROTID ARTERY, UNSPECIFIED LATERALITY: ICD-10-CM

## 2023-01-16 DIAGNOSIS — R42 DIZZINESS: ICD-10-CM

## 2023-01-16 DIAGNOSIS — E78.5 HYPERLIPIDEMIA LDL GOAL <70: ICD-10-CM

## 2023-01-16 DIAGNOSIS — Z86.73 HISTORY OF STROKE: ICD-10-CM

## 2023-01-16 DIAGNOSIS — Z72.0 TOBACCO USE: ICD-10-CM

## 2023-01-16 DIAGNOSIS — I10 ESSENTIAL HYPERTENSION: Primary | ICD-10-CM

## 2023-01-16 DIAGNOSIS — I73.9 INTERMITTENT CLAUDICATION: ICD-10-CM

## 2023-01-16 PROCEDURE — 99214 OFFICE O/P EST MOD 30 MIN: CPT

## 2023-01-16 RX ORDER — ROSUVASTATIN CALCIUM 10 MG/1
10 TABLET, COATED ORAL NIGHTLY
Qty: 90 TABLET | Refills: 3 | Status: SHIPPED | OUTPATIENT
Start: 2023-01-16

## 2023-01-16 RX ORDER — AMLODIPINE BESYLATE 5 MG/1
5 TABLET ORAL DAILY
Qty: 90 TABLET | Refills: 2 | Status: SHIPPED | OUTPATIENT
Start: 2023-01-16

## 2023-01-16 RX ORDER — CLOPIDOGREL BISULFATE 75 MG/1
75 TABLET ORAL DAILY
Qty: 90 TABLET | Refills: 3 | Status: SHIPPED | OUTPATIENT
Start: 2023-01-16

## 2023-01-16 NOTE — PROGRESS NOTES
[facility] [place]  [facility] CARDIOLOGY  [address] [place] [address]  Dept: [phone]  Dept Fax: [phone]    Agustina Taylor  1970    Follow Up Office Visit Note    History of Present Illness:  Agustina Taylor is a 52 y.o. female who presents to the clinic for Follow up HTN, dizziness.  She is doing well denies all cardiac complaints today, no CP, SOB, LE edema, orthopnea, PND, palpitations, dizziness.  Pressure is improving on valsartan 360 daily and amlodipine 5 added last visit.  /70 on recheck.  WILLIAMS mild by duplex, history of stroke on Plavix tolerating well, denies bleeding.  She is still smoking, sent Chantix but she states she is not taking.  Advised to start statin therapy, she has not yet started Crestor, but is agreeable we will resend to pharmacy today.  PE today seems normal.  Advised to stop smoking, will resend Chantix today as she is agreeable to this.  We will follow-up in 6 months or sooner as indicated.    The following portions of the patient's history were reviewed and updated as appropriate: allergies, current medications, past family history, past medical history, past social history, past surgical history, and problem list.    Medications:  amLODIPine  busPIRone  clopidogrel  cyclobenzaprine  hydrOXYzine pamoate  lamoTRIgine  mirtazapine  pantoprazole  REXULTI PO  rosuvastatin  traZODone  valsartan  Varenicline Tartrate (Starter) tablet therapy pack    Subjective  Allergies   Allergen Reactions   • Aspirin Anaphylaxis   • Vraylar [Cariprazine] Hives        Past Medical History:   Diagnosis Date   • Depression    • Hypertension        History reviewed. No pertinent surgical history.    Family History   Problem Relation Age of Onset   • Hypertension Mother    • Hyperlipidemia Father    • Hypertension Father         Social History     Socioeconomic History   • Marital status: Single   Tobacco Use   • Smoking status: Every Day     Packs/day: 0.50     " Years: 31.00     Pack years: 15.50     Types: Cigarettes   • Smokeless tobacco: Never   Vaping Use   • Vaping Use: Never used   Substance and Sexual Activity   • Alcohol use: Not Currently   • Drug use: Not Currently     Types: Methamphetamines, Marijuana     Comment: recovering   • Sexual activity: Defer       Review of Systems   All other systems reviewed and are negative.      Cardiovascular Procedures    ECHO/MUGA:   STRESS TESTS:   CARDIAC CATH:   DEVICES:   HOLTER:   CT/MRI:   VASCULAR:   CARDIOTHORACIC:     Objective  Vitals:    01/16/23 0956   BP: 118/74   BP Location: Right arm   Patient Position: Lying   Cuff Size: Adult   Pulse: 76   Resp: 16   Temp: 98 °F (36.7 °C)   TempSrc: Infrared   SpO2: 99%   Weight: 66.7 kg (147 lb)   Height: 149.9 cm (59\")   PainSc: 0-No pain     Body mass index is 29.69 kg/m².     Physical Exam  Vitals reviewed.   Constitutional:       Appearance: Healthy appearance. Not in distress.   Neck:      Vascular: No JVR. JVD normal.   Pulmonary:      Effort: Pulmonary effort is normal.      Breath sounds: Normal breath sounds. No wheezing. No rhonchi. No rales.   Chest:      Chest wall: Not tender to palpatation.   Cardiovascular:      PMI at left midclavicular line. Normal rate. Regular rhythm. Normal S1. Normal S2.      Murmurs: There is a grade 2/6 systolic murmur at the URSB.      No gallop. No click. No rub.   Pulses:     Carotid: with bruit bilaterally.  Edema:     Peripheral edema absent.   Abdominal:      General: Bowel sounds are normal.      Palpations: Abdomen is soft.      Tenderness: There is no abdominal tenderness.   Musculoskeletal: Normal range of motion.         General: No tenderness. Skin:     General: Skin is warm and dry.   Neurological:      General: No focal deficit present.      Mental Status: Alert and oriented to person, place and time.          Diagnostic Data  Procedures    Assessment and Plan  Diagnoses and all orders for this visit:    1. Essential " hypertension (Primary)  On valsartan 360 daily, amlodipine 5 daily started last visit.  BP improved today 124/70 on recheck.  Continue current therapy.    2. Dizziness  Denies complaints today.  Seemed more positional last visits.  With normal historical labs, carotids only mild disease bilateral.    3. History of stroke  2012, on Plavix.      4. Intermittent claudication (HCC)  VELIA normal right.  Left 0.8 at rest, 1 after walking mild disease.  Advised to start Crestor, she has not picked up from pharmacy yet.  We will resend today advise she should start this for risk reduction.    5. Tobacco use  Still smoking, advised cessation, she would like to quit.  We will call in [employer] as she states it never went to pharmacy.    6. Hyperlipidemia < 70  , Nataliia 170 8 October 2022.  ASCVD 10-year risk 10%.  Sent Crestor however she never picked up.  Advised strongly that she will need to take this today to decrease her risk.  She is agreeable.  Lipid next visit.       Return in about 6 months (around 7/16/2023) for Recheck.    Lynn Ridley, APRN  01/16/2023

## 2023-02-13 RX ORDER — VALSARTAN 320 MG/1
320 TABLET ORAL DAILY
Qty: 30 TABLET | Refills: 3 | Status: SHIPPED | OUTPATIENT
Start: 2023-02-13

## 2024-01-17 ENCOUNTER — OFFICE VISIT (OUTPATIENT)
Dept: CARDIOLOGY | Facility: CLINIC | Age: 54
End: 2024-01-17
Payer: COMMERCIAL

## 2024-01-17 VITALS
WEIGHT: 131 LBS | OXYGEN SATURATION: 96 % | HEIGHT: 59 IN | DIASTOLIC BLOOD PRESSURE: 61 MMHG | RESPIRATION RATE: 16 BRPM | BODY MASS INDEX: 26.41 KG/M2 | HEART RATE: 92 BPM | SYSTOLIC BLOOD PRESSURE: 111 MMHG

## 2024-01-17 DIAGNOSIS — Z86.73 HISTORY OF STROKE: ICD-10-CM

## 2024-01-17 DIAGNOSIS — E78.5 HYPERLIPIDEMIA LDL GOAL <70: ICD-10-CM

## 2024-01-17 DIAGNOSIS — I10 ESSENTIAL HYPERTENSION: Primary | ICD-10-CM

## 2024-01-17 DIAGNOSIS — Z72.0 TOBACCO USE: ICD-10-CM

## 2024-01-17 DIAGNOSIS — I73.9 INTERMITTENT CLAUDICATION: ICD-10-CM

## 2024-01-17 DIAGNOSIS — I65.29 STENOSIS OF CAROTID ARTERY, UNSPECIFIED LATERALITY: ICD-10-CM

## 2024-01-17 RX ORDER — AMLODIPINE BESYLATE 2.5 MG/1
2.5 TABLET ORAL DAILY
Qty: 90 TABLET | Refills: 1 | Status: SHIPPED | OUTPATIENT
Start: 2024-01-17

## 2024-01-17 RX ORDER — VALSARTAN 320 MG/1
320 TABLET ORAL DAILY
Qty: 90 TABLET | Refills: 1 | Status: SHIPPED | OUTPATIENT
Start: 2024-01-17

## 2024-01-17 RX ORDER — CLOPIDOGREL BISULFATE 75 MG/1
75 TABLET ORAL DAILY
Qty: 90 TABLET | Refills: 1 | Status: SHIPPED | OUTPATIENT
Start: 2024-01-17

## 2024-01-17 RX ORDER — ROSUVASTATIN CALCIUM 10 MG/1
10 TABLET, COATED ORAL NIGHTLY
Qty: 90 TABLET | Refills: 1 | Status: SHIPPED | OUTPATIENT
Start: 2024-01-17

## 2024-01-17 NOTE — PROGRESS NOTES
[facility] [place]  [facility] CARDIOLOGY  [address] [place] [address]  Dept: [phone]  Dept Fax: [phone]    Agustina Taylor  1970    Follow Up Office Visit Note    History of Present Illness:  Agustina Taylor is a 53 y.o. female who presents to the clinic for Follow-up. She is doing well today without major cardiac complaints, still some leg pain left left otherwise no complaints at all. She had VELIA which was normal right and borderline left 0.8 rest and 1.0 after walking. We did discuss further evaluation with CT to look at legs but she would like to hold on this for now, advised smoking cessation, she is not ready at this time. Bp today is borderline low, she has lost some weight, lower norvasc to 2.5 and advised to watch at home.     The following portions of the patient's history were reviewed and updated as appropriate: allergies, current medications, past family history, past medical history, past social history, past surgical history, and problem list.    Medications:  amLODIPine  clopidogrel  cyclobenzaprine  hydrOXYzine pamoate  lamoTRIgine  pantoprazole  REXULTI PO  rosuvastatin  traZODone  valsartan    Subjective  Allergies   Allergen Reactions   • Aspirin Anaphylaxis   • Vraylar [Cariprazine] Hives        Past Medical History:   Diagnosis Date   • Depression    • Hypertension        History reviewed. No pertinent surgical history.    Family History   Problem Relation Age of Onset   • Hypertension Mother    • Hyperlipidemia Father    • Hypertension Father         Social History     Socioeconomic History   • Marital status: Single   Tobacco Use   • Smoking status: Every Day     Packs/day: 0.50     Years: 31.00     Additional pack years: 0.00     Total pack years: 15.50     Types: Cigarettes   • Smokeless tobacco: Never   Vaping Use   • Vaping Use: Never used   Substance and Sexual Activity   • Alcohol use: Not Currently   • Drug use: Not Currently     Types:  "Methamphetamines, Marijuana     Comment: recovering   • Sexual activity: Defer       Review of Systems   All other systems reviewed and are negative.    Cardiovascular Procedures    ECHO/MUGA:   STRESS TESTS:   CARDIAC CATH:   DEVICES:   HOLTER:   CT/MRI:   VASCULAR:   CARDIOTHORACIC:     Objective  Vitals:    01/17/24 1250   BP: 111/61   BP Location: Right arm   Patient Position: Sitting   Cuff Size: Adult   Pulse: 92   Resp: 16   SpO2: 96%   Weight: 59.4 kg (131 lb)   Height: 149.9 cm (59\")   PainSc: 0-No pain     Body mass index is 26.46 kg/m².     Physical Exam  Vitals reviewed.   Constitutional:       General: Awake.      Appearance: Normal and healthy appearance. Not in distress.   Neck:      Vascular: No JVR. JVD normal.   Pulmonary:      Effort: Pulmonary effort is normal.      Breath sounds: Normal breath sounds. No wheezing. No rhonchi. No rales.   Chest:      Chest wall: Not tender to palpatation.   Cardiovascular:      PMI at left midclavicular line. Normal rate. Regular rhythm. Normal S1. Normal S2.       Murmurs: There is a grade 1to 2/6 systolic murmur at the URSB.      No gallop.  No click. No rub.   Pulses:     Intact distal pulses.   Edema:     Peripheral edema absent.   Abdominal:      General: Bowel sounds are normal.      Palpations: Abdomen is soft.      Tenderness: There is no abdominal tenderness.   Musculoskeletal: Normal range of motion.         General: No tenderness. Skin:     General: Skin is warm and dry.   Neurological:      General: No focal deficit present.      Mental Status: Alert and oriented to person, place and time.   Psychiatric:         Behavior: Behavior is cooperative.        Diagnostic Data    ECG 12 Lead    Date/Time: 1/17/2024 1:32 PM  Performed by: Lynn Ridley APRN    Authorized by: Lynn Ridley APRN  Comparison: compared with previous ECG from 10/24/2022  Similar to previous ECG  Rhythm: sinus rhythm  Rate: normal  BPM: 76  QRS axis: " normal  Other findings: non-specific ST-T wave changes    Clinical impression: non-specific ECG      Advance Care Planning          Assessment and Plan  Diagnoses and all orders for this visit:    1. Essential hypertension (Primary)  BP today 100/60 on recheck, will lower Norvasc to 2.5, keep Valsartan 320, monitor home BP and report continued low readings.   -     amLODIPine (NORVASC) 2.5 MG tablet; Take 1 tablet by mouth Daily.  Dispense: 90 tablet; Refill: 1  -     valsartan (DIOVAN) 320 MG tablet; Take 1 tablet by mouth Daily.  Dispense: 90 tablet; Refill: 1  -     CBC & Differential  -     Comprehensive Metabolic Panel  -     TSH Rfx On Abnormal To Free T4    2. Hyperlipidemia LDL goal <70  On Crestor 20,  2022, will recheck labs today. Continue plan pending result.   -     rosuvastatin (CRESTOR) 10 MG tablet; Take 1 tablet by mouth Every Night.  Dispense: 90 tablet; Refill: 1  -     Lipid Panel  -     CK    3. Intermittent claudication  Still complaints left leg, VELIA as above. She does not want to have further testing at this time. She is on medical management. Will observe.     4. History of stroke  2012, plavix, statin, continue plan.   -     rosuvastatin (CRESTOR) 10 MG tablet; Take 1 tablet by mouth Every Night.  Dispense: 90 tablet; Refill: 1  -     clopidogrel (Plavix) 75 MG tablet; Take 1 tablet by mouth Daily.  Dispense: 90 tablet; Refill: 1    5. Tobacco use  Still smoking, not agreeable for cessation.     6. Stenosis of carotid artery, unspecified laterality  Only mild luminal, on medical management. Continue plan.   -     clopidogrel (Plavix) 75 MG tablet; Take 1 tablet by mouth Daily.  Dispense: 90 tablet; Refill: 1         Return in about 6 months (around 7/17/2024) for Recheck, Lynn YUAN.    LISSY Melo  01/17/2024    Part of this note may be an electronic transcription/translation of spoken language to printed text using the Dragon Dictation System.

## 2024-01-18 ENCOUNTER — TELEPHONE (OUTPATIENT)
Dept: CARDIOLOGY | Facility: CLINIC | Age: 54
End: 2024-01-18
Payer: COMMERCIAL

## 2024-01-18 LAB
ALBUMIN SERPL-MCNC: 4.6 G/DL (ref 3.8–4.9)
ALBUMIN/GLOB SERPL: 1.8 {RATIO} (ref 1.2–2.2)
ALP SERPL-CCNC: 95 IU/L (ref 44–121)
ALT SERPL-CCNC: 25 IU/L (ref 0–32)
AST SERPL-CCNC: 14 IU/L (ref 0–40)
BASOPHILS # BLD AUTO: 0.1 X10E3/UL (ref 0–0.2)
BASOPHILS NFR BLD AUTO: 1 %
BILIRUB SERPL-MCNC: 0.2 MG/DL (ref 0–1.2)
BUN SERPL-MCNC: 15 MG/DL (ref 6–24)
BUN/CREAT SERPL: 19 (ref 9–23)
CALCIUM SERPL-MCNC: 9.5 MG/DL (ref 8.7–10.2)
CHLORIDE SERPL-SCNC: 103 MMOL/L (ref 96–106)
CHOLEST SERPL-MCNC: 133 MG/DL (ref 100–199)
CK SERPL-CCNC: 41 U/L (ref 32–182)
CO2 SERPL-SCNC: 25 MMOL/L (ref 20–29)
CREAT SERPL-MCNC: 0.81 MG/DL (ref 0.57–1)
EGFRCR SERPLBLD CKD-EPI 2021: 86 ML/MIN/1.73
EOSINOPHIL # BLD AUTO: 0.2 X10E3/UL (ref 0–0.4)
EOSINOPHIL NFR BLD AUTO: 2 %
ERYTHROCYTE [DISTWIDTH] IN BLOOD BY AUTOMATED COUNT: 12.9 % (ref 11.7–15.4)
GLOBULIN SER CALC-MCNC: 2.6 G/DL (ref 1.5–4.5)
GLUCOSE SERPL-MCNC: 105 MG/DL (ref 70–99)
HCT VFR BLD AUTO: 39.6 % (ref 34–46.6)
HDLC SERPL-MCNC: 59 MG/DL
HGB BLD-MCNC: 13.2 G/DL (ref 11.1–15.9)
IMM GRANULOCYTES # BLD AUTO: 0 X10E3/UL (ref 0–0.1)
IMM GRANULOCYTES NFR BLD AUTO: 0 %
LDLC SERPL CALC-MCNC: 58 MG/DL (ref 0–99)
LYMPHOCYTES # BLD AUTO: 3.4 X10E3/UL (ref 0.7–3.1)
LYMPHOCYTES NFR BLD AUTO: 38 %
MCH RBC QN AUTO: 28.4 PG (ref 26.6–33)
MCHC RBC AUTO-ENTMCNC: 33.3 G/DL (ref 31.5–35.7)
MCV RBC AUTO: 85 FL (ref 79–97)
MONOCYTES # BLD AUTO: 0.6 X10E3/UL (ref 0.1–0.9)
MONOCYTES NFR BLD AUTO: 6 %
NEUTROPHILS # BLD AUTO: 4.7 X10E3/UL (ref 1.4–7)
NEUTROPHILS NFR BLD AUTO: 53 %
PLATELET # BLD AUTO: 394 X10E3/UL (ref 150–450)
POTASSIUM SERPL-SCNC: 4.5 MMOL/L (ref 3.5–5.2)
PROT SERPL-MCNC: 7.2 G/DL (ref 6–8.5)
RBC # BLD AUTO: 4.64 X10E6/UL (ref 3.77–5.28)
SODIUM SERPL-SCNC: 142 MMOL/L (ref 134–144)
TRIGL SERPL-MCNC: 80 MG/DL (ref 0–149)
TSH SERPL DL<=0.005 MIU/L-ACNC: 1.89 UIU/ML (ref 0.45–4.5)
VLDLC SERPL CALC-MCNC: 16 MG/DL (ref 5–40)
WBC # BLD AUTO: 8.9 X10E3/UL (ref 3.4–10.8)

## 2024-01-18 NOTE — TELEPHONE ENCOUNTER
----- Message from LISSY Melo sent at 1/18/2024  1:00 PM EST -----  Please let her know that her labs looked good. Her cholesterol has shown significant improvement LDL now 58 and was 136 so she is now at goal. All other labs normal.

## 2024-02-15 ENCOUNTER — OFFICE VISIT (OUTPATIENT)
Dept: FAMILY MEDICINE CLINIC | Facility: CLINIC | Age: 54
End: 2024-02-15
Payer: COMMERCIAL

## 2024-02-15 VITALS
HEART RATE: 72 BPM | BODY MASS INDEX: 25.08 KG/M2 | SYSTOLIC BLOOD PRESSURE: 100 MMHG | HEIGHT: 59 IN | DIASTOLIC BLOOD PRESSURE: 66 MMHG | OXYGEN SATURATION: 98 % | RESPIRATION RATE: 18 BRPM | WEIGHT: 124.4 LBS

## 2024-02-15 DIAGNOSIS — M62.838 MUSCLE SPASM: ICD-10-CM

## 2024-02-15 DIAGNOSIS — Z12.31 SCREENING MAMMOGRAM FOR BREAST CANCER: ICD-10-CM

## 2024-02-15 DIAGNOSIS — Z12.11 SCREENING FOR MALIGNANT NEOPLASM OF COLON: ICD-10-CM

## 2024-02-15 DIAGNOSIS — Z00.00 ENCOUNTER FOR ROUTINE ADULT MEDICAL EXAMINATION: Primary | ICD-10-CM

## 2024-02-15 PROBLEM — F31.81 BIPOLAR 2 DISORDER: Status: ACTIVE | Noted: 2024-02-15

## 2024-02-15 PROCEDURE — 36415 COLL VENOUS BLD VENIPUNCTURE: CPT | Performed by: PHYSICIAN ASSISTANT

## 2024-02-15 RX ORDER — CYCLOBENZAPRINE HCL 10 MG
10 TABLET ORAL 3 TIMES DAILY PRN
Qty: 90 TABLET | Refills: 3 | Status: SHIPPED | OUTPATIENT
Start: 2024-02-15

## 2024-02-15 RX ORDER — FLUOXETINE HYDROCHLORIDE 20 MG/1
1 CAPSULE ORAL DAILY
COMMUNITY
Start: 2024-02-07

## 2024-02-15 RX ORDER — PRAZOSIN HYDROCHLORIDE 2 MG/1
4 CAPSULE ORAL
COMMUNITY
Start: 2024-02-04

## 2024-02-16 PROBLEM — M62.838 MUSCLE SPASM: Status: ACTIVE | Noted: 2024-02-16

## 2024-02-16 LAB
ALBUMIN SERPL-MCNC: 4.6 G/DL (ref 3.8–4.9)
ALBUMIN/GLOB SERPL: 1.7 {RATIO} (ref 1.2–2.2)
ALP SERPL-CCNC: 80 IU/L (ref 44–121)
ALT SERPL-CCNC: 10 IU/L (ref 0–32)
AST SERPL-CCNC: 11 IU/L (ref 0–40)
BASOPHILS # BLD AUTO: 0.1 X10E3/UL (ref 0–0.2)
BASOPHILS NFR BLD AUTO: 1 %
BILIRUB SERPL-MCNC: 0.2 MG/DL (ref 0–1.2)
BUN SERPL-MCNC: 15 MG/DL (ref 6–24)
BUN/CREAT SERPL: 18 (ref 9–23)
CALCIUM SERPL-MCNC: 9.7 MG/DL (ref 8.7–10.2)
CHLORIDE SERPL-SCNC: 101 MMOL/L (ref 96–106)
CHOLEST SERPL-MCNC: 104 MG/DL (ref 100–199)
CO2 SERPL-SCNC: 25 MMOL/L (ref 20–29)
CREAT SERPL-MCNC: 0.84 MG/DL (ref 0.57–1)
EGFRCR SERPLBLD CKD-EPI 2021: 82 ML/MIN/1.73
EOSINOPHIL # BLD AUTO: 0.2 X10E3/UL (ref 0–0.4)
EOSINOPHIL NFR BLD AUTO: 2 %
ERYTHROCYTE [DISTWIDTH] IN BLOOD BY AUTOMATED COUNT: 13.6 % (ref 11.7–15.4)
GLOBULIN SER CALC-MCNC: 2.7 G/DL (ref 1.5–4.5)
GLUCOSE SERPL-MCNC: 95 MG/DL (ref 70–99)
HBA1C MFR BLD: 5.9 % (ref 4.8–5.6)
HCT VFR BLD AUTO: 39.9 % (ref 34–46.6)
HCV IGG SERPL QL IA: REACTIVE
HDLC SERPL-MCNC: 42 MG/DL
HGB BLD-MCNC: 13 G/DL (ref 11.1–15.9)
IMM GRANULOCYTES # BLD AUTO: 0 X10E3/UL (ref 0–0.1)
IMM GRANULOCYTES NFR BLD AUTO: 0 %
LDLC SERPL CALC-MCNC: 47 MG/DL (ref 0–99)
LYMPHOCYTES # BLD AUTO: 3.4 X10E3/UL (ref 0.7–3.1)
LYMPHOCYTES NFR BLD AUTO: 42 %
MCH RBC QN AUTO: 27.9 PG (ref 26.6–33)
MCHC RBC AUTO-ENTMCNC: 32.6 G/DL (ref 31.5–35.7)
MCV RBC AUTO: 86 FL (ref 79–97)
MONOCYTES # BLD AUTO: 0.6 X10E3/UL (ref 0.1–0.9)
MONOCYTES NFR BLD AUTO: 8 %
NEUTROPHILS # BLD AUTO: 3.8 X10E3/UL (ref 1.4–7)
NEUTROPHILS NFR BLD AUTO: 47 %
PLATELET # BLD AUTO: 376 X10E3/UL (ref 150–450)
POTASSIUM SERPL-SCNC: 4.7 MMOL/L (ref 3.5–5.2)
PROT SERPL-MCNC: 7.3 G/DL (ref 6–8.5)
RBC # BLD AUTO: 4.66 X10E6/UL (ref 3.77–5.28)
SODIUM SERPL-SCNC: 139 MMOL/L (ref 134–144)
TRIGL SERPL-MCNC: 68 MG/DL (ref 0–149)
TSH SERPL DL<=0.005 MIU/L-ACNC: 0.83 UIU/ML (ref 0.45–4.5)
VLDLC SERPL CALC-MCNC: 15 MG/DL (ref 5–40)
WBC # BLD AUTO: 8.2 X10E3/UL (ref 3.4–10.8)

## 2024-02-29 DIAGNOSIS — R76.8 POSITIVE HEPATITIS C ANTIBODY TEST: Primary | ICD-10-CM

## 2024-03-06 ENCOUNTER — LAB (OUTPATIENT)
Dept: FAMILY MEDICINE CLINIC | Facility: CLINIC | Age: 54
End: 2024-03-06
Payer: COMMERCIAL

## 2024-03-06 DIAGNOSIS — R76.8 POSITIVE HEPATITIS C ANTIBODY TEST: ICD-10-CM

## 2024-03-06 PROCEDURE — 36415 COLL VENOUS BLD VENIPUNCTURE: CPT | Performed by: PHYSICIAN ASSISTANT

## 2024-03-07 LAB — HCV RNA SERPL NAA+PROBE-ACNC: NORMAL IU/ML

## 2024-03-13 LAB
HCV RNA SERPL NAA+PROBE-ACNC: NORMAL IU/ML
TEST INFORMATION: NORMAL

## 2024-07-19 ENCOUNTER — OFFICE VISIT (OUTPATIENT)
Dept: CARDIOLOGY | Facility: CLINIC | Age: 54
End: 2024-07-19
Payer: COMMERCIAL

## 2024-07-19 VITALS
HEIGHT: 59 IN | SYSTOLIC BLOOD PRESSURE: 128 MMHG | BODY MASS INDEX: 22.18 KG/M2 | DIASTOLIC BLOOD PRESSURE: 62 MMHG | WEIGHT: 110 LBS | OXYGEN SATURATION: 98 % | HEART RATE: 97 BPM

## 2024-07-19 DIAGNOSIS — E78.5 HYPERLIPIDEMIA LDL GOAL <70: ICD-10-CM

## 2024-07-19 DIAGNOSIS — Z86.73 HISTORY OF STROKE: ICD-10-CM

## 2024-07-19 DIAGNOSIS — I10 ESSENTIAL HYPERTENSION: Primary | ICD-10-CM

## 2024-07-19 DIAGNOSIS — I73.9 INTERMITTENT CLAUDICATION: ICD-10-CM

## 2024-07-19 DIAGNOSIS — Z72.0 TOBACCO USE: ICD-10-CM

## 2024-07-19 PROCEDURE — 1160F RVW MEDS BY RX/DR IN RCRD: CPT | Performed by: INTERNAL MEDICINE

## 2024-07-19 PROCEDURE — 1159F MED LIST DOCD IN RCRD: CPT | Performed by: INTERNAL MEDICINE

## 2024-07-19 PROCEDURE — 3078F DIAST BP <80 MM HG: CPT | Performed by: INTERNAL MEDICINE

## 2024-07-19 PROCEDURE — 99214 OFFICE O/P EST MOD 30 MIN: CPT | Performed by: INTERNAL MEDICINE

## 2024-07-19 PROCEDURE — 3074F SYST BP LT 130 MM HG: CPT | Performed by: INTERNAL MEDICINE

## 2024-07-20 PROCEDURE — 93000 ELECTROCARDIOGRAM COMPLETE: CPT | Performed by: INTERNAL MEDICINE

## 2024-07-20 NOTE — ASSESSMENT & PLAN NOTE
Lipid abnormalities are improving with treatment    Plan:  Continue same medication/s without change.  Rosuvastatin 10 mg p.o. daily.    Discussed medication dosage, use, side effects, and goals of treatment in detail.    Counseled patient on lifestyle modifications to help control hyperlipidemia.   Cholesterol lowering dietary information shared with patient.  Advised patient to exercise for 150 minutes weekly. (30 minute brisk walk, 5 days a week for example)  Stop tobacco use encouraged  Patient counseled in regards to heart healthy, low fat/ low cholesterol/low sat diet, daily exercise for 30 minutes, low to moderate intensity, and weight loss.    Lab Results   Component Value Date    LDL 47 02/15/2024    LDL 58 01/17/2024     (H) 10/28/2022    HDL 42 02/15/2024    HDL 59 01/17/2024    HDL 41 10/28/2022    CHLPL 104 02/15/2024    CHLPL 133 01/17/2024    CHLPL 209 (H) 10/28/2022    TRIG 68 02/15/2024    TRIG 80 01/17/2024    TRIG 178 (H) 10/28/2022     Patient Treatment Goals:   LDL goal is less than 70, to target    Followup in 6 months.

## 2024-07-20 NOTE — ASSESSMENT & PLAN NOTE
Good pulses bilateral lower extremity.  Past VELIA normal.  Patient with significant back problems.  To discuss with her primary care provider/spine surgeon repeat back imaging since has been a while.  Repeat VELIA for reassurance purposes.  Smoking cessation.

## 2024-07-20 NOTE — ASSESSMENT & PLAN NOTE
Ms. Agustina Taylor  reports that she has been smoking cigarettes. She has a 15.5 pack-year smoking history. She has never used smokeless tobacco..  I have educated her on the risk of diseases from using tobacco products such as cancer, COPD, heart disease, and arterial disease.   I advised her to quit and she is willing to quit. We have discussed the following method/s for tobacco cessation:  Counseling. .  She will follow up with me in 6 month or sooner to check on her progress.  I spent 10 minutes counseling the patient.

## 2024-07-20 NOTE — ASSESSMENT & PLAN NOTE
Hypertension is stable and controlled.  Echo with normal EF, mild to moderate LVH, grade 1 diastolic dysfunction, mild pulm hypertension.  Continue current treatment regimen.  Dietary sodium restriction.  Weight loss.  Regular aerobic exercise.  Stop smoking.  Ambulatory blood pressure monitoring.  Blood pressure will be reassessed in 6 months.

## 2024-07-20 NOTE — PROGRESS NOTES
[facility] [place]  [facility] CARDIOLOGY  [address] [place] [address]  Dept: [phone]  Dept Fax: [phone]    Date: 07/19/2024  Patient: Agustina Taylor  YOB: 1970    Follow Up Office Visit Note    Interval Follow-up  Ms. Agustina Taylor is a 54 y.o. female who is here for follow-up on Hypertension and Hyperlipidemia.    Subjective   Patient seen today doing well.  Admits to pain in the left lower extremity, unrelated to exercise or stress, type numbness and pain.  Patient denies angina, orthopnea, PND, palpitations, lightheadedness, syncope or medications side-effects.    The following portions of the patient's history were reviewed and updated as appropriate: allergies, current medications, past family history, past medical history, past social history, past surgical history, and problem list.    Medications:   Allergies   Allergen Reactions    Aspirin Anaphylaxis    Vraylar [Cariprazine] Hives      Current Outpatient Medications   Medication Instructions    amLODIPine (NORVASC) 2.5 mg, Oral, Daily    Brexpiprazole (REXULTI PO) Oral    clopidogrel (PLAVIX) 75 mg, Oral, Daily    cyclobenzaprine (FLEXERIL) 10 mg, Oral, 3 Times Daily PRN, for muscle spams    FLUoxetine (PROzac) 20 MG capsule 1 capsule, Oral, Daily    hydrOXYzine pamoate (VISTARIL) 50 mg, Oral, 3 Times Daily PRN    lamoTRIgine (LAMICTAL) 200 mg, Oral, 2 Times Daily    pantoprazole (PROTONIX) 40 mg, Oral, Daily    prazosin (MINIPRESS) 4 mg, Oral, Every Night at Bedtime    rosuvastatin (CRESTOR) 10 mg, Oral, Nightly    traZODone (DESYREL) 50 mg, Oral, Nightly    valsartan (DIOVAN) 320 mg, Oral, Daily       Tobacco Use: High Risk (7/20/2024)    Patient History     Smoking Tobacco Use: Every Day     Smokeless Tobacco Use: Never     Passive Exposure: Not on file        Objective  Vitals:    07/19/24 1617   BP: 128/62   BP Location: Right arm   Patient Position: Sitting   Pulse: 97   SpO2: 98%   Weight:  "49.9 kg (110 lb)   Height: 149.9 cm (59\")   PainSc: 0-No pain      Vitals:    07/19/24 1617   BP: 128/62   BP Location: Right arm   Patient Position: Sitting   Pulse: 97   SpO2: 98%   Weight: 49.9 kg (110 lb)   Height: 149.9 cm (59\")          Physical Exam  Constitutional:       Appearance: Not in distress.   Neck:      Vascular: JVD normal.   Pulmonary:      Breath sounds: Normal breath sounds.   Cardiovascular:      Normal rate. Regular rhythm.      Murmurs: There is no murmur.   Pulses:     Intact distal pulses.   Edema:     Peripheral edema absent.   Neurological:      Mental Status: Alert.              Diagnostic Data  Lab Results   Component Value Date     02/15/2024    K 4.7 02/15/2024     02/15/2024    CO2 25 02/15/2024    BUN 15 02/15/2024    CREATININE 0.84 02/15/2024    CALCIUM 9.7 02/15/2024    BILITOT 0.2 02/15/2024    ALKPHOS 80 02/15/2024    ALT 10 02/15/2024    AST 11 02/15/2024    GLUCOSE 95 02/15/2024    ALBUMIN 4.6 02/15/2024     Lab Results   Component Value Date    WBC 8.2 02/15/2024    HGB 13.0 02/15/2024    HCT 39.9 02/15/2024     02/15/2024     No results found for: \"APTT\", \"INR\", \"PTT\"  Lab Results   Component Value Date    CKTOTAL 41 01/17/2024    CKTOTAL 60 10/28/2022     No results found for: \"BNP\", \"PROBNP\"    Lab Results   Component Value Date    CHLPL 104 02/15/2024    TRIG 68 02/15/2024    HDL 42 02/15/2024    LDL 47 02/15/2024     Lab Results   Component Value Date    TSH 0.833 02/15/2024       CV Diagnostics:    ECG 12 Lead    Date/Time: 7/20/2024 12:00 PM  Performed by: Manjidner Bermeo MD    Authorized by: Manjinder Bermeo MD  Comparison: compared with previous ECG from 1/17/2024  Similar to previous ECG  Rhythm: sinus rhythm  Other findings: non-specific ST-T wave changes  Comments: Normal sinus rhythm with nonspecific ST-T changes, unchanged from prior.          CXR: No results found for this or any previous visit.     ECHO/MUGA: Results for orders placed in visit " on 11/01/22    Adult Transthoracic Echo Complete W/ Cont if Necessary Per Protocol    Interpretation Summary    Left ventricular systolic function is normal. Left ventricular ejection fraction appears to be 56 - 60%.    Left ventricular wall thickness is consistent with mild to moderate concentric hypertrophy.    Left ventricular mass index is increased.    Left ventricular diastolic function is consistent with (grade I) impaired relaxation.    Estimated right ventricular systolic pressure from tricuspid regurgitation is mildly elevated (35-45 mmHg).    The aortic valve could not be well visualized with trivial AI    Mild Tr, trivial AI,    Normal RV size and function    No pericardial effusion     STRESS TESTS: No results found for this or any previous visit.     CARDIAC CATH: No results found for this or any previous visit.     DEVICES: No valid procedures specified.   HOLTER: No results found for this or any previous visit.     CT/MRI:  No results found for this or any previous visit.    VASCULAR: No valid procedures specified.     Assessment and Plan  Diagnoses and all orders for this visit:    1. Essential hypertension (Primary)  Assessment & Plan:  Hypertension is stable and controlled.  Echo with normal EF, mild to moderate LVH, grade 1 diastolic dysfunction, mild pulm hypertension.  Continue current treatment regimen.  Dietary sodium restriction.  Weight loss.  Regular aerobic exercise.  Stop smoking.  Ambulatory blood pressure monitoring.  Blood pressure will be reassessed in 6 months.    Orders:  -     ECG 12 Lead    2. Hyperlipidemia LDL goal <70  Assessment & Plan:   Lipid abnormalities are improving with treatment    Plan:  Continue same medication/s without change.  Rosuvastatin 10 mg p.o. daily.    Discussed medication dosage, use, side effects, and goals of treatment in detail.    Counseled patient on lifestyle modifications to help control hyperlipidemia.   Cholesterol lowering dietary information  shared with patient.  Advised patient to exercise for 150 minutes weekly. (30 minute brisk walk, 5 days a week for example)  Stop tobacco use encouraged  Patient counseled in regards to heart healthy, low fat/ low cholesterol/low sat diet, daily exercise for 30 minutes, low to moderate intensity, and weight loss.    Lab Results   Component Value Date    LDL 47 02/15/2024    LDL 58 01/17/2024     (H) 10/28/2022    HDL 42 02/15/2024    HDL 59 01/17/2024    HDL 41 10/28/2022    CHLPL 104 02/15/2024    CHLPL 133 01/17/2024    CHLPL 209 (H) 10/28/2022    TRIG 68 02/15/2024    TRIG 80 01/17/2024    TRIG 178 (H) 10/28/2022     Patient Treatment Goals:   LDL goal is less than 70, to target    Followup in 6 months.      3. Intermittent claudication  Assessment & Plan:  Good pulses bilateral lower extremity.  Past VELIA normal.  Patient with significant back problems.  To discuss with her primary care provider/spine surgeon repeat back imaging since has been a while.  Repeat VELIA for reassurance purposes.  Smoking cessation.    Orders:  -     Doppler Ankle Brachial Index Single Level CAR; Future    4. Tobacco use  Assessment & Plan:  Ms. Agustina Taylor  reports that she has been smoking cigarettes. She has a 15.5 pack-year smoking history. She has never used smokeless tobacco..  I have educated her on the risk of diseases from using tobacco products such as cancer, COPD, heart disease, and arterial disease.   I advised her to quit and she is willing to quit. We have discussed the following method/s for tobacco cessation:  Counseling. .  She will follow up with me in 6 month or sooner to check on her progress.  I spent 10 minutes counseling the patient.        5. History of stroke  Assessment & Plan:  In 2012.  Carotid ultrasound with no significant stenosis.  Patient on clopidogrel and rosuvastatin for secondary prevention, amlodipine and valsartan for blood pressure control.  Continue optimal medical therapy.  Smoking  cessation strongly encouraged.           No follow-ups on file.    There are no Patient Instructions on file for this visit.    Manjinder Bermeo MD

## 2024-07-20 NOTE — ASSESSMENT & PLAN NOTE
In 2012.  Carotid ultrasound with no significant stenosis.  Patient on clopidogrel and rosuvastatin for secondary prevention, amlodipine and valsartan for blood pressure control.  Continue optimal medical therapy.  Smoking cessation strongly encouraged.

## 2024-07-26 DIAGNOSIS — I10 ESSENTIAL HYPERTENSION: ICD-10-CM

## 2024-07-26 RX ORDER — AMLODIPINE BESYLATE 2.5 MG/1
2.5 TABLET ORAL DAILY
Qty: 90 TABLET | Refills: 1 | Status: SHIPPED | OUTPATIENT
Start: 2024-07-26

## 2024-08-05 RX ORDER — CYCLOBENZAPRINE HCL 10 MG
10 TABLET ORAL 3 TIMES DAILY PRN
Qty: 90 TABLET | Refills: 3 | Status: SHIPPED | OUTPATIENT
Start: 2024-08-05

## 2024-10-09 ENCOUNTER — TELEPHONE (OUTPATIENT)
Dept: CARDIOLOGY | Facility: CLINIC | Age: 54
End: 2024-10-09
Payer: COMMERCIAL

## 2024-10-09 NOTE — TELEPHONE ENCOUNTER
Call placed to patient to discuss velia results. No answer, left voicemail requesting call back.   OK for Hub to relay-Please inform patient that her VELIA was normal.

## 2024-10-09 NOTE — TELEPHONE ENCOUNTER
----- Message from Manjinder Bermeo sent at 10/8/2024 11:22 PM EDT -----  Please inform patient that her VELIA was normal.   Thank you!

## 2024-12-02 RX ORDER — CYCLOBENZAPRINE HCL 10 MG
10 TABLET ORAL 3 TIMES DAILY PRN
Qty: 90 TABLET | Refills: 3 | Status: SHIPPED | OUTPATIENT
Start: 2024-12-02

## 2025-01-20 ENCOUNTER — OFFICE VISIT (OUTPATIENT)
Dept: CARDIOLOGY | Facility: CLINIC | Age: 55
End: 2025-01-20
Payer: COMMERCIAL

## 2025-01-20 VITALS
DIASTOLIC BLOOD PRESSURE: 62 MMHG | RESPIRATION RATE: 18 BRPM | HEIGHT: 59 IN | BODY MASS INDEX: 25.48 KG/M2 | SYSTOLIC BLOOD PRESSURE: 112 MMHG | WEIGHT: 126.4 LBS | HEART RATE: 78 BPM | OXYGEN SATURATION: 96 %

## 2025-01-20 DIAGNOSIS — E78.5 HYPERLIPIDEMIA LDL GOAL <70: ICD-10-CM

## 2025-01-20 DIAGNOSIS — Z86.73 HISTORY OF STROKE: ICD-10-CM

## 2025-01-20 DIAGNOSIS — Z72.0 TOBACCO USE: ICD-10-CM

## 2025-01-20 DIAGNOSIS — I10 ESSENTIAL HYPERTENSION: Primary | ICD-10-CM

## 2025-01-20 PROBLEM — I73.9 INTERMITTENT CLAUDICATION: Status: RESOLVED | Noted: 2022-10-24 | Resolved: 2025-01-20

## 2025-01-20 PROCEDURE — 1160F RVW MEDS BY RX/DR IN RCRD: CPT | Performed by: INTERNAL MEDICINE

## 2025-01-20 PROCEDURE — 1159F MED LIST DOCD IN RCRD: CPT | Performed by: INTERNAL MEDICINE

## 2025-01-20 PROCEDURE — 3078F DIAST BP <80 MM HG: CPT | Performed by: INTERNAL MEDICINE

## 2025-01-20 PROCEDURE — 99214 OFFICE O/P EST MOD 30 MIN: CPT | Performed by: INTERNAL MEDICINE

## 2025-01-20 PROCEDURE — 3074F SYST BP LT 130 MM HG: CPT | Performed by: INTERNAL MEDICINE

## 2025-01-20 RX ORDER — BENZTROPINE MESYLATE 0.5 MG/1
0.5 TABLET ORAL DAILY
COMMUNITY

## 2025-01-20 RX ORDER — ROSUVASTATIN CALCIUM 10 MG/1
10 TABLET, COATED ORAL NIGHTLY
Qty: 90 TABLET | Refills: 1 | Status: SHIPPED | OUTPATIENT
Start: 2025-01-20

## 2025-01-20 NOTE — ASSESSMENT & PLAN NOTE
Hypertension is stable and controlled.  Echo with normal EF, mild to moderate LVH, grade 1 diastolic dysfunction, mild pulm hypertension.  Continue current treatment regimen with amlodipine 2.5 mg, prazosin 2 mg, valsartan 320 mg.  Dietary sodium restriction.  Weight loss.  Regular aerobic exercise.  Stopping cigarette smoking strongly reinforced, patient has Nicorette gums.  Ambulatory blood pressure monitoring.  Blood pressure will be reassessed in 6 months

## 2025-01-20 NOTE — ASSESSMENT & PLAN NOTE
In 2012.  Carotid ultrasound with no significant stenosis, mild atherosclerosis in 2022.  Patient on clopidogrel and rosuvastatin for secondary prevention, amlodipine, valsartan and prazosin for blood pressure control.  VELIA 10/2004 negative.  Continue optimal medical therapy.  Smoking cessation strongly encouraged.

## 2025-01-20 NOTE — ASSESSMENT & PLAN NOTE
Ms. Agustina Taylor  reports that she has been smoking cigarettes. She has a 15.5 pack-year smoking history. She has never used smokeless tobacco..  I have educated her on the risk of diseases from using tobacco products such as cancer, COPD, heart disease, and cataracts.   I advised her to quit and she is willing to quit. We have discussed the following method/s for tobacco cessation:  Counseling and OTC Cessation Products.    I spent 7 minutes counseling the patient.

## 2025-01-20 NOTE — PROGRESS NOTES
"[facility]  [facility] CARDIOLOGY  [address] [place] [address]  Dept: [phone]  Dept Fax: [phone]    Date: 01/20/2025  Patient: Agustina Taylor  YOB: 1970    Follow Up Office Visit Note    Interval Follow-up  Ms. Agustina Taylor is a 54 y.o. female who is here for follow-up on Hypertension and Hyperlipidemia.    Subjective   Patient doing well with no complaints.  Patient denies angina, orthopnea, PND, palpitations, lightheadedness, syncope or medications side-effects.      The following portions of the patient's history were reviewed and updated as appropriate: allergies, current medications, past family history, past medical history, past social history, past surgical history, and problem list.    Medications:   Allergies   Allergen Reactions    Aspirin Anaphylaxis    Valacyclovir Other (See Comments)     Other Reaction(s): Unknown    Vraylar [Cariprazine] Hives      Current Outpatient Medications   Medication Instructions    amLODIPine (NORVASC) 2.5 mg, Oral, Daily    benztropine (COGENTIN) 0.5 mg, Oral, Daily    Brexpiprazole (REXULTI PO) Oral    clopidogrel (PLAVIX) 75 mg, Oral, Daily    cyclobenzaprine (FLEXERIL) 10 mg, Oral, 3 Times Daily PRN, for muscle spams    FLUoxetine (PROzac) 20 MG capsule 1 capsule, Oral, Daily    lamoTRIgine (LAMICTAL) 200 mg, Oral, 2 Times Daily    pantoprazole (PROTONIX) 40 mg, Oral, Daily    prazosin (MINIPRESS) 4 mg, Oral, Every Night at Bedtime    rosuvastatin (CRESTOR) 10 mg, Oral, Nightly    traZODone (DESYREL) 50 mg, Oral, Nightly    valsartan (DIOVAN) 320 mg, Oral, Daily       Tobacco Use: High Risk (1/20/2025)    Patient History     Smoking Tobacco Use: Every Day     Smokeless Tobacco Use: Never     Passive Exposure: Not on file        Objective  Vitals:    01/20/25 1314   BP: 112/62   Pulse: 78   Resp: 18   SpO2: 96%   Weight: 57.3 kg (126 lb 6.4 oz)   Height: 149.9 cm (59\")   PainSc: 0-No pain      Vitals:    " "01/20/25 1314   BP: 112/62   Pulse: 78   Resp: 18   SpO2: 96%   Weight: 57.3 kg (126 lb 6.4 oz)   Height: 149.9 cm (59\")          Physical Exam  Constitutional:       Appearance: Not in distress.   Neck:      Vascular: JVD normal.   Pulmonary:      Breath sounds: Normal breath sounds.   Cardiovascular:      Normal rate. Regular rhythm.      Murmurs: There is no murmur.   Pulses:     Intact distal pulses.   Edema:     Peripheral edema absent.   Neurological:      Mental Status: Alert.              Diagnostic Data  Lab Results   Component Value Date     02/15/2024    K 4.7 02/15/2024     02/15/2024    CO2 25 02/15/2024    BUN 15 02/15/2024    CREATININE 0.84 02/15/2024    CALCIUM 9.7 02/15/2024    BILITOT 0.2 02/15/2024    ALKPHOS 80 02/15/2024    ALT 10 02/15/2024    AST 11 02/15/2024    GLUCOSE 95 02/15/2024    ALBUMIN 4.6 02/15/2024     Lab Results   Component Value Date    WBC 8.2 02/15/2024    HGB 13.0 02/15/2024    HCT 39.9 02/15/2024     02/15/2024     No results found for: \"APTT\", \"INR\", \"PTT\"  Lab Results   Component Value Date    CKTOTAL 38 12/03/2024    CKTOTAL 41 01/17/2024    CKTOTAL 60 10/28/2022     No results found for: \"BNP\", \"PROBNP\"    Lab Results   Component Value Date    CHLPL 104 02/15/2024    TRIG 68 02/15/2024    HDL 42 02/15/2024    LDL 47 02/15/2024     Lab Results   Component Value Date    TSH 0.833 02/15/2024       CV Diagnostics:  Procedures    CXR: No results found for this or any previous visit.     ECHO/MUGA: Results for orders placed in visit on 11/01/22    Adult Transthoracic Echo Complete W/ Cont if Necessary Per Protocol    Interpretation Summary    Left ventricular systolic function is normal. Left ventricular ejection fraction appears to be 56 - 60%.    Left ventricular wall thickness is consistent with mild to moderate concentric hypertrophy.    Left ventricular mass index is increased.    Left ventricular diastolic function is consistent with (grade I) " impaired relaxation.    Estimated right ventricular systolic pressure from tricuspid regurgitation is mildly elevated (35-45 mmHg).    The aortic valve could not be well visualized with trivial AI    Mild Tr, trivial AI,    Normal RV size and function    No pericardial effusion     STRESS TESTS: No results found for this or any previous visit.     CARDIAC CATH: No results found for this or any previous visit.     DEVICES: No valid procedures specified.   HOLTER: No results found for this or any previous visit.     CT/MRI:  No results found for this or any previous visit.    VASCULAR: No valid procedures specified.     Assessment and Plan  Diagnoses and all orders for this visit:    1. Essential hypertension (Primary)  Assessment & Plan:  Hypertension is stable and controlled.  Echo with normal EF, mild to moderate LVH, grade 1 diastolic dysfunction, mild pulm hypertension.  Continue current treatment regimen with amlodipine 2.5 mg, prazosin 2 mg, valsartan 320 mg.  Dietary sodium restriction.  Weight loss.  Regular aerobic exercise.  Stopping cigarette smoking strongly reinforced, patient has Nicorette gums.  Ambulatory blood pressure monitoring.  Blood pressure will be reassessed in 6 months      2. Hyperlipidemia LDL goal <70  Assessment & Plan:   Lipid abnormalities are improving with treatment    Plan:  Continue same medication/s without change.  Rosuvastatin 10 mg p.o. daily    Discussed medication dosage, use, side effects, and goals of treatment in detail.    Counseled patient on lifestyle modifications to help control hyperlipidemia.   Advised patient to exercise for 150 minutes weekly. (30 minute brisk walk, 5 days a week for example)  Stop tobacco use encouraged  Lab Results   Component Value Date    LDL 47 02/15/2024    LDL 58 01/17/2024    HDL 42 02/15/2024    HDL 59 01/17/2024    CHLPL 104 02/15/2024    CHLPL 133 01/17/2024    TRIG 68 02/15/2024    TRIG 80 01/17/2024       Patient Treatment Goals:   LDL  goal is less than 70; to target    Followup in 6 months.    Orders:  -     rosuvastatin (CRESTOR) 10 MG tablet; Take 1 tablet by mouth Every Night.  Dispense: 90 tablet; Refill: 1    3. Tobacco use  Assessment & Plan:  Ms. Agustina Taylor  reports that she has been smoking cigarettes. She has a 15.5 pack-year smoking history. She has never used smokeless tobacco..  I have educated her on the risk of diseases from using tobacco products such as cancer, COPD, heart disease, and cataracts.   I advised her to quit and she is willing to quit. We have discussed the following method/s for tobacco cessation:  Counseling and OTC Cessation Products.    I spent 7 minutes counseling the patient.         4. History of stroke  Assessment & Plan:  In 2012.  Carotid ultrasound with no significant stenosis, mild atherosclerosis in 2022.  Patient on clopidogrel and rosuvastatin for secondary prevention, amlodipine, valsartan and prazosin for blood pressure control.  VELIA 10/2004 negative.  Continue optimal medical therapy.  Smoking cessation strongly encouraged.     Orders:  -     rosuvastatin (CRESTOR) 10 MG tablet; Take 1 tablet by mouth Every Night.  Dispense: 90 tablet; Refill: 1         Return in about 6 months (around 7/20/2025) for Follow-up with Dr Bermeo.    There are no Patient Instructions on file for this visit.    Manjinder Bermeo MD

## 2025-01-20 NOTE — ASSESSMENT & PLAN NOTE
Lipid abnormalities are improving with treatment    Plan:  Continue same medication/s without change.  Rosuvastatin 10 mg p.o. daily    Discussed medication dosage, use, side effects, and goals of treatment in detail.    Counseled patient on lifestyle modifications to help control hyperlipidemia.   Advised patient to exercise for 150 minutes weekly. (30 minute brisk walk, 5 days a week for example)  Stop tobacco use encouraged  Lab Results   Component Value Date    LDL 47 02/15/2024    LDL 58 01/17/2024    HDL 42 02/15/2024    HDL 59 01/17/2024    CHLPL 104 02/15/2024    CHLPL 133 01/17/2024    TRIG 68 02/15/2024    TRIG 80 01/17/2024       Patient Treatment Goals:   LDL goal is less than 70; to target    Followup in 6 months.

## 2025-02-07 ENCOUNTER — TELEPHONE (OUTPATIENT)
Dept: CARDIOLOGY | Facility: CLINIC | Age: 55
End: 2025-02-07
Payer: COMMERCIAL

## 2025-02-07 DIAGNOSIS — Z86.73 HISTORY OF STROKE: ICD-10-CM

## 2025-02-07 DIAGNOSIS — I65.29 STENOSIS OF CAROTID ARTERY, UNSPECIFIED LATERALITY: ICD-10-CM

## 2025-02-07 RX ORDER — CLOPIDOGREL BISULFATE 75 MG/1
75 TABLET ORAL DAILY
Qty: 90 TABLET | Refills: 1 | Status: SHIPPED | OUTPATIENT
Start: 2025-02-07

## 2025-02-20 ENCOUNTER — OFFICE VISIT (OUTPATIENT)
Dept: FAMILY MEDICINE CLINIC | Facility: CLINIC | Age: 55
End: 2025-02-20
Payer: COMMERCIAL

## 2025-02-20 VITALS
WEIGHT: 126.3 LBS | OXYGEN SATURATION: 97 % | HEART RATE: 83 BPM | BODY MASS INDEX: 25.51 KG/M2 | TEMPERATURE: 97.8 F | DIASTOLIC BLOOD PRESSURE: 58 MMHG | SYSTOLIC BLOOD PRESSURE: 110 MMHG

## 2025-02-20 DIAGNOSIS — J01.00 ACUTE NON-RECURRENT MAXILLARY SINUSITIS: ICD-10-CM

## 2025-02-20 DIAGNOSIS — Z00.00 ENCOUNTER FOR ROUTINE ADULT MEDICAL EXAMINATION: Primary | ICD-10-CM

## 2025-02-20 DIAGNOSIS — J02.9 SORE THROAT: ICD-10-CM

## 2025-02-20 DIAGNOSIS — Z12.31 SCREENING MAMMOGRAM FOR BREAST CANCER: ICD-10-CM

## 2025-02-20 DIAGNOSIS — E66.3 OVERWEIGHT (BMI 25.0-29.9): ICD-10-CM

## 2025-02-20 PROBLEM — Z86.0100 HISTORY OF COLONIC POLYPS: Status: ACTIVE | Noted: 2025-02-20

## 2025-02-20 LAB
EXPIRATION DATE: NORMAL
EXPIRATION DATE: NORMAL
FLUAV AG UPPER RESP QL IA.RAPID: NOT DETECTED
FLUBV AG UPPER RESP QL IA.RAPID: NOT DETECTED
INTERNAL CONTROL: NORMAL
INTERNAL CONTROL: NORMAL
Lab: NORMAL
Lab: NORMAL
S PYO AG THROAT QL: NEGATIVE
SARS-COV-2 AG UPPER RESP QL IA.RAPID: NOT DETECTED

## 2025-02-20 PROCEDURE — 3074F SYST BP LT 130 MM HG: CPT | Performed by: PHYSICIAN ASSISTANT

## 2025-02-20 PROCEDURE — 1160F RVW MEDS BY RX/DR IN RCRD: CPT | Performed by: PHYSICIAN ASSISTANT

## 2025-02-20 PROCEDURE — 1126F AMNT PAIN NOTED NONE PRSNT: CPT | Performed by: PHYSICIAN ASSISTANT

## 2025-02-20 PROCEDURE — 87428 SARSCOV & INF VIR A&B AG IA: CPT | Performed by: PHYSICIAN ASSISTANT

## 2025-02-20 PROCEDURE — 87880 STREP A ASSAY W/OPTIC: CPT | Performed by: PHYSICIAN ASSISTANT

## 2025-02-20 PROCEDURE — 99396 PREV VISIT EST AGE 40-64: CPT | Performed by: PHYSICIAN ASSISTANT

## 2025-02-20 PROCEDURE — 3078F DIAST BP <80 MM HG: CPT | Performed by: PHYSICIAN ASSISTANT

## 2025-02-20 PROCEDURE — 1159F MED LIST DOCD IN RCRD: CPT | Performed by: PHYSICIAN ASSISTANT

## 2025-02-20 RX ORDER — DEXTROMETHORPHAN HYDROBROMIDE AND PROMETHAZINE HYDROCHLORIDE 15; 6.25 MG/5ML; MG/5ML
5 SYRUP ORAL 4 TIMES DAILY PRN
Qty: 240 ML | Refills: 0 | Status: SHIPPED | OUTPATIENT
Start: 2025-02-20

## 2025-02-20 RX ORDER — TRAZODONE HYDROCHLORIDE 100 MG/1
100-200 TABLET ORAL NIGHTLY PRN
COMMUNITY
Start: 2025-02-15

## 2025-02-20 RX ORDER — AMOXICILLIN 875 MG/1
875 TABLET, COATED ORAL 2 TIMES DAILY
Qty: 20 TABLET | Refills: 0 | Status: SHIPPED | OUTPATIENT
Start: 2025-02-20 | End: 2025-03-02

## 2025-02-20 NOTE — ASSESSMENT & PLAN NOTE
Rx for amoxicillin and Promethazine DM recommended rest and fluids and call or return if symptoms persist or worsen.  Her COVID flu and strep tests were all negative.

## 2025-02-20 NOTE — ASSESSMENT & PLAN NOTE
Patient's (Body mass index is 25.51 kg/m².) indicates that they are overweight with health conditions that include hypertension and dyslipidemias . Weight is unchanged. BMI is above average; BMI management plan is completed. We discussed low calorie, low carb based diet program, portion control, and increasing exercise.

## 2025-02-20 NOTE — PROGRESS NOTES
Chief Complaint  Annual Exam and URI    Subjective          Agustina Taylor presents to [facility]    History of Present Illness patient is here today for her annual physical, blood work and she did not need any medicine refilled today.  Most of her medicines are prescribed by specialist.  She does have some upper respiratory symptoms including cough and sore throat and bodyaches which has been going on for about 2 weeks.      Objective   Vital Signs:   /58   Pulse 83   Temp 97.8 °F (36.6 °C)   Wt 57.3 kg (126 lb 4.8 oz)   SpO2 97%   BMI 25.51 kg/m²     Body mass index is 25.51 kg/m².    Review of Systems   Constitutional: Negative.  Negative for chills, fatigue and fever.   HENT:  Positive for congestion, postnasal drip, sinus pressure and sore throat. Negative for ear pain and swollen glands.    Eyes: Negative.    Respiratory:  Positive for cough. Negative for shortness of breath.    Cardiovascular: Negative.  Negative for chest pain and palpitations.   Gastrointestinal: Negative.    Endocrine: Negative.    Genitourinary: Negative.    Musculoskeletal: Negative.  Negative for back pain and myalgias.   Skin: Negative.    Allergic/Immunologic: Negative.    Neurological:  Negative for dizziness and headache.   Hematological: Negative.    Psychiatric/Behavioral: Negative.  Negative for depressed mood. The patient is not nervous/anxious.        Past History:  Medical History: has a past medical history of Depression and Hypertension.   Surgical History: has no past surgical history on file.   Family History: family history includes Hyperlipidemia in her father; Hypertension in her father and mother.   Social History: reports that she has been smoking cigarettes. She has a 15.5 pack-year smoking history. She has never used smokeless tobacco. She reports that she does not currently use alcohol. She reports that she does not currently use drugs after having used the following drugs:  Methamphetamines and Marijuana.      Current Outpatient Medications:     amLODIPine (NORVASC) 2.5 MG tablet, Take 1 tablet by mouth Daily., Disp: 90 tablet, Rfl: 1    benztropine (COGENTIN) 0.5 MG tablet, Take 1 tablet by mouth Daily., Disp: , Rfl:     Brexpiprazole (REXULTI PO), Take  by mouth., Disp: , Rfl:     clopidogrel (Plavix) 75 MG tablet, Take 1 tablet by mouth Daily., Disp: 90 tablet, Rfl: 1    cyclobenzaprine (FLEXERIL) 10 MG tablet, TAKE 1 TABLET BY MOUTH THREE TIMES DAILY AS NEEDED FOR MUSCLE SPASMS, Disp: 90 tablet, Rfl: 3    FLUoxetine (PROzac) 20 MG capsule, Take 1 capsule by mouth Daily., Disp: , Rfl:     lamoTRIgine (LaMICtal) 200 MG tablet, Take 1 tablet by mouth 2 (Two) Times a Day., Disp: , Rfl:     pantoprazole (PROTONIX) 40 MG EC tablet, Take 1 tablet by mouth Daily., Disp: , Rfl:     prazosin (MINIPRESS) 2 MG capsule, Take 2 capsules by mouth every night at bedtime., Disp: , Rfl:     rosuvastatin (CRESTOR) 10 MG tablet, Take 1 tablet by mouth Every Night., Disp: 90 tablet, Rfl: 1    traZODone (DESYREL) 100 MG tablet, Take 1-2 tablets by mouth At Night As Needed. for sleep, Disp: , Rfl:     valsartan (DIOVAN) 320 MG tablet, Take 1 tablet by mouth Daily., Disp: 90 tablet, Rfl: 1    amoxicillin (AMOXIL) 875 MG tablet, Take 1 tablet by mouth 2 (Two) Times a Day for 10 days., Disp: 20 tablet, Rfl: 0    promethazine-dextromethorphan (PROMETHAZINE-DM) 6.25-15 MG/5ML syrup, Take 5 mL by mouth 4 (Four) Times a Day As Needed for Cough., Disp: 240 mL, Rfl: 0    Allergies: Aspirin, Valacyclovir, and Vraylar [cariprazine]    Physical Exam  Vitals reviewed.   Constitutional:       Appearance: Normal appearance.   HENT:      Head: Normocephalic and atraumatic.      Right Ear: Tympanic membrane, ear canal and external ear normal.      Left Ear: Tympanic membrane, ear canal and external ear normal.      Nose: Nose normal. Congestion present.      Right Sinus: Maxillary sinus tenderness present.      Left  Sinus: Maxillary sinus tenderness present.      Mouth/Throat:      Mouth: Mucous membranes are moist.      Pharynx: Posterior oropharyngeal erythema present.   Eyes:      Extraocular Movements: Extraocular movements intact.      Pupils: Pupils are equal, round, and reactive to light.   Cardiovascular:      Rate and Rhythm: Normal rate and regular rhythm.      Pulses: Normal pulses.      Heart sounds: Normal heart sounds.   Pulmonary:      Effort: Pulmonary effort is normal.      Breath sounds: Normal breath sounds.   Abdominal:      General: Abdomen is flat. Bowel sounds are normal.      Palpations: Abdomen is soft.   Musculoskeletal:         General: Normal range of motion.      Cervical back: Normal range of motion and neck supple.   Lymphadenopathy:      Cervical: No cervical adenopathy.   Skin:     General: Skin is warm and dry.   Neurological:      General: No focal deficit present.      Mental Status: She is alert and oriented to person, place, and time.   Psychiatric:         Mood and Affect: Mood normal.         Behavior: Behavior normal.          Result Review :                   Assessment and Plan    Diagnoses and all orders for this visit:    1. Encounter for routine adult medical examination (Primary)  Assessment & Plan:  Discussed injury prevention, diet and exercise, safe sexual practices, and screening for common diseases. Encouraged use of sunscreen and seatbelts. Encouraged SBE, avoidance of tobacco, limiting alcohol, and yearly dental and eye exams.     Orders:  -     CBC & Differential; Future  -     Comprehensive Metabolic Panel; Future  -     Hemoglobin A1c; Future  -     TSH; Future  -     Lipid Panel; Future  -     Lipid Panel  -     TSH  -     Hemoglobin A1c  -     Comprehensive Metabolic Panel  -     CBC & Differential    2. Screening mammogram for breast cancer  Assessment & Plan:  Mammogram has been ordered.    Orders:  -     Mammo Screening Digital Tomosynthesis Bilateral With CAD;  Future    3. Overweight (BMI 25.0-29.9)  Assessment & Plan:  Patient's (Body mass index is 25.51 kg/m².) indicates that they are overweight with health conditions that include hypertension and dyslipidemias . Weight is unchanged. BMI is above average; BMI management plan is completed. We discussed low calorie, low carb based diet program, portion control, and increasing exercise.       4. Acute non-recurrent maxillary sinusitis  Assessment & Plan:  Rx for amoxicillin and Promethazine DM recommended rest and fluids and call or return if symptoms persist or worsen.  Her COVID flu and strep tests were all negative.      5. Sore throat  -     POCT rapid strep A  -     POCT SARS-CoV-2 Antigen TOMY + Flu    Other orders  -     promethazine-dextromethorphan (PROMETHAZINE-DM) 6.25-15 MG/5ML syrup; Take 5 mL by mouth 4 (Four) Times a Day As Needed for Cough.  Dispense: 240 mL; Refill: 0  -     amoxicillin (AMOXIL) 875 MG tablet; Take 1 tablet by mouth 2 (Two) Times a Day for 10 days.  Dispense: 20 tablet; Refill: 0        Follow Up   Return in about 6 months (around 8/20/2025) for establish with Noe.  Patient was given instructions and counseling regarding her condition or for health maintenance advice. Please see specific information pulled into the AVS if appropriate.     Catherine Bentley PA-C

## 2025-02-21 ENCOUNTER — TELEPHONE (OUTPATIENT)
Dept: FAMILY MEDICINE CLINIC | Facility: CLINIC | Age: 55
End: 2025-02-21
Payer: COMMERCIAL

## 2025-02-21 LAB
ALBUMIN SERPL-MCNC: 4.4 G/DL (ref 3.8–4.9)
ALP SERPL-CCNC: 99 IU/L (ref 44–121)
ALT SERPL-CCNC: 17 IU/L (ref 0–32)
AST SERPL-CCNC: 16 IU/L (ref 0–40)
BASOPHILS # BLD AUTO: 0.1 X10E3/UL (ref 0–0.2)
BASOPHILS NFR BLD AUTO: 1 %
BILIRUB SERPL-MCNC: <0.2 MG/DL (ref 0–1.2)
BUN SERPL-MCNC: 21 MG/DL (ref 6–24)
BUN/CREAT SERPL: 22 (ref 9–23)
CALCIUM SERPL-MCNC: 9.6 MG/DL (ref 8.7–10.2)
CHLORIDE SERPL-SCNC: 101 MMOL/L (ref 96–106)
CHOLEST SERPL-MCNC: 147 MG/DL (ref 100–199)
CO2 SERPL-SCNC: 25 MMOL/L (ref 20–29)
CREAT SERPL-MCNC: 0.96 MG/DL (ref 0.57–1)
EGFRCR SERPLBLD CKD-EPI 2021: 69 ML/MIN/1.73
EOSINOPHIL # BLD AUTO: 0.2 X10E3/UL (ref 0–0.4)
EOSINOPHIL NFR BLD AUTO: 2 %
ERYTHROCYTE [DISTWIDTH] IN BLOOD BY AUTOMATED COUNT: 13.5 % (ref 11.7–15.4)
GLOBULIN SER CALC-MCNC: 3.2 G/DL (ref 1.5–4.5)
GLUCOSE SERPL-MCNC: 96 MG/DL (ref 70–99)
HBA1C MFR BLD: 5.8 % (ref 4.8–5.6)
HCT VFR BLD AUTO: 39.4 % (ref 34–46.6)
HDLC SERPL-MCNC: 46 MG/DL
HGB BLD-MCNC: 12.9 G/DL (ref 11.1–15.9)
IMM GRANULOCYTES # BLD AUTO: 0 X10E3/UL (ref 0–0.1)
IMM GRANULOCYTES NFR BLD AUTO: 0 %
LDLC SERPL CALC-MCNC: 83 MG/DL (ref 0–99)
LYMPHOCYTES # BLD AUTO: 3 X10E3/UL (ref 0.7–3.1)
LYMPHOCYTES NFR BLD AUTO: 30 %
MCH RBC QN AUTO: 27.5 PG (ref 26.6–33)
MCHC RBC AUTO-ENTMCNC: 32.7 G/DL (ref 31.5–35.7)
MCV RBC AUTO: 84 FL (ref 79–97)
MONOCYTES # BLD AUTO: 0.7 X10E3/UL (ref 0.1–0.9)
MONOCYTES NFR BLD AUTO: 7 %
NEUTROPHILS # BLD AUTO: 6 X10E3/UL (ref 1.4–7)
NEUTROPHILS NFR BLD AUTO: 60 %
PLATELET # BLD AUTO: 429 X10E3/UL (ref 150–450)
POTASSIUM SERPL-SCNC: 4.6 MMOL/L (ref 3.5–5.2)
PROT SERPL-MCNC: 7.6 G/DL (ref 6–8.5)
RBC # BLD AUTO: 4.69 X10E6/UL (ref 3.77–5.28)
SODIUM SERPL-SCNC: 139 MMOL/L (ref 134–144)
TRIGL SERPL-MCNC: 94 MG/DL (ref 0–149)
TSH SERPL DL<=0.005 MIU/L-ACNC: 1.78 UIU/ML (ref 0.45–4.5)
VLDLC SERPL CALC-MCNC: 18 MG/DL (ref 5–40)
WBC # BLD AUTO: 10 X10E3/UL (ref 3.4–10.8)

## 2025-02-21 NOTE — TELEPHONE ENCOUNTER
"Contacted pt to let pt know with recent lab results that:    \"Her cholesterol, thyroid, liver and kidney function and complete blood count were all normal.  The A1c was 5.8 a little better than a year ago but still in the prediabetic range and I continue to encourage her to reduce sweets and and carbs in her diet and try to get regular daily exercise.\"    Pt verbally understood.  "

## 2025-06-30 RX ORDER — CYCLOBENZAPRINE HCL 10 MG
10 TABLET ORAL 3 TIMES DAILY PRN
Qty: 90 TABLET | Refills: 3 | Status: SHIPPED | OUTPATIENT
Start: 2025-06-30

## 2025-07-17 DIAGNOSIS — Z86.73 HISTORY OF STROKE: ICD-10-CM

## 2025-07-17 DIAGNOSIS — E78.5 HYPERLIPIDEMIA LDL GOAL <70: ICD-10-CM

## 2025-07-17 RX ORDER — ROSUVASTATIN CALCIUM 10 MG/1
10 TABLET, COATED ORAL NIGHTLY
Qty: 90 TABLET | Refills: 1 | Status: SHIPPED | OUTPATIENT
Start: 2025-07-17

## 2025-07-18 ENCOUNTER — OFFICE VISIT (OUTPATIENT)
Dept: CARDIOLOGY | Facility: CLINIC | Age: 55
End: 2025-07-18
Payer: COMMERCIAL

## 2025-07-18 VITALS
OXYGEN SATURATION: 96 % | DIASTOLIC BLOOD PRESSURE: 60 MMHG | BODY MASS INDEX: 25.29 KG/M2 | SYSTOLIC BLOOD PRESSURE: 122 MMHG | WEIGHT: 125.2 LBS | HEART RATE: 91 BPM

## 2025-07-18 DIAGNOSIS — E78.5 HYPERLIPIDEMIA LDL GOAL <70: ICD-10-CM

## 2025-07-18 DIAGNOSIS — Z86.73 HISTORY OF STROKE: ICD-10-CM

## 2025-07-18 DIAGNOSIS — Z72.0 TOBACCO USE: ICD-10-CM

## 2025-07-18 DIAGNOSIS — I10 ESSENTIAL HYPERTENSION: Primary | ICD-10-CM

## 2025-07-18 DIAGNOSIS — I73.9 PVD (PERIPHERAL VASCULAR DISEASE) WITH CLAUDICATION: ICD-10-CM

## 2025-07-18 PROCEDURE — 3074F SYST BP LT 130 MM HG: CPT | Performed by: INTERNAL MEDICINE

## 2025-07-18 PROCEDURE — 93000 ELECTROCARDIOGRAM COMPLETE: CPT | Performed by: INTERNAL MEDICINE

## 2025-07-18 PROCEDURE — 1160F RVW MEDS BY RX/DR IN RCRD: CPT | Performed by: INTERNAL MEDICINE

## 2025-07-18 PROCEDURE — 3078F DIAST BP <80 MM HG: CPT | Performed by: INTERNAL MEDICINE

## 2025-07-18 PROCEDURE — 99214 OFFICE O/P EST MOD 30 MIN: CPT | Performed by: INTERNAL MEDICINE

## 2025-07-18 PROCEDURE — 1159F MED LIST DOCD IN RCRD: CPT | Performed by: INTERNAL MEDICINE

## 2025-07-18 RX ORDER — NICOTINE 21 MG/24HR
1 PATCH, TRANSDERMAL 24 HOURS TRANSDERMAL EVERY 24 HOURS
Qty: 30 PATCH | Refills: 2 | Status: SHIPPED | OUTPATIENT
Start: 2025-07-18

## 2025-07-18 RX ORDER — OLANZAPINE 5 MG/1
5 TABLET, FILM COATED ORAL NIGHTLY
COMMUNITY
Start: 2025-07-16

## 2025-07-18 NOTE — PROGRESS NOTES
[facility] [place]  [facility]  [address] [place] [address]  Dept: [phone]  Dept Fax: [phone]    Date: 07/18/2025  Patient: Agustina Taylor  YOB: 1970    Follow Up Office Visit Note    Interval Follow-up  Ms. Agustina Taylor is a 55 y.o. female who is here for follow-up on Essential hypertension.    Subjective     History of Present Illness  The patient presents for a follow-up visit.    She reports no new health issues since her last visit. She has not experienced any recurrence of stroke symptoms such as balance problems, speech difficulties, or weakness.    She continues to smoke a pack of cigarettes daily and has made unsuccessful attempts to quit. She finds smoking calming and believes that nicotine patches might aid in her cessation efforts. She has not previously tried nicotine patches.    She experiences significant cramping in her left leg during physical activity.  Patient denies angina, orthopnea, PND, palpitations, lightheadedness, syncope or medications side-effects.  SOCIAL HISTORY  Tobacco: The patient smokes a pack of cigarettes a day.    The following portions of the patient's history were reviewed and updated as appropriate: allergies, current medications, past family history, past medical history, past social history, past surgical history, and problem list.    Medications:   Allergies   Allergen Reactions    Aspirin Anaphylaxis    Valacyclovir Other (See Comments)     Other Reaction(s): Unknown    Vraylar [Cariprazine] Hives      Current Outpatient Medications   Medication Instructions    amLODIPine (NORVASC) 2.5 mg, Oral, Daily    benztropine (COGENTIN) 0.5 mg, Daily    clopidogrel (PLAVIX) 75 mg, Oral, Daily    cyclobenzaprine (FLEXERIL) 10 mg, Oral, 3 Times Daily PRN, for muscle spams    FLUoxetine (PROzac) 20 MG capsule 1 capsule, Daily    lamoTRIgine (LAMICTAL) 200 mg, 2 Times Daily    nicotine (NICODERM CQ) 21 MG/24HR patch 1  patch, Transdermal, Every 24 Hours    nicotine polacrilex (NICORETTE) 2 mg, Mouth/Throat, As Needed, DO NOT USE AND SMOKE.    OLANZapine (ZYPREXA) 5 mg, Nightly    pantoprazole (PROTONIX) 40 mg, Daily    prazosin (MINIPRESS) 4 mg, Every Night at Bedtime    promethazine-dextromethorphan (PROMETHAZINE-DM) 6.25-15 MG/5ML syrup 5 mL, Oral, 4 Times Daily PRN    rosuvastatin (CRESTOR) 10 mg, Oral, Nightly    traZODone (DESYREL) 100-200 mg, Nightly PRN    valsartan (DIOVAN) 320 mg, Oral, Daily       Tobacco Use: High Risk (7/18/2025)    Patient History     Smoking Tobacco Use: Every Day     Smokeless Tobacco Use: Never     Passive Exposure: Not on file        Objective  Vitals:    07/18/25 1323   BP: 122/60   BP Location: Right arm   Patient Position: Sitting   Cuff Size: Adult   Pulse: 91   SpO2: 96%   Weight: 56.8 kg (125 lb 3.2 oz)      Vitals:    07/18/25 1323   BP: 122/60   BP Location: Right arm   Patient Position: Sitting   Cuff Size: Adult   Pulse: 91   SpO2: 96%   Weight: 56.8 kg (125 lb 3.2 oz)          Physical Exam  Constitutional:       Appearance: Not in distress.   Neck:      Vascular: JVD normal.   Pulmonary:      Breath sounds: Normal breath sounds.   Cardiovascular:      Normal rate. Regular rhythm.      Murmurs: There is no murmur.   Pulses:     Dorsalis pedis: 1+ on the left side and 4+ on the right side.     Posterior tibial: 1+ on the left side and 4+ on the right side.  Edema:     Peripheral edema absent.   Neurological:      Mental Status: Alert.              Diagnostic Data  Lab Results   Component Value Date     02/20/2025    K 4.6 02/20/2025     02/20/2025    CO2 25 02/20/2025    BUN 21 02/20/2025    CREATININE 0.96 02/20/2025    CALCIUM 9.6 02/20/2025    BILITOT <0.2 02/20/2025    ALKPHOS 99 02/20/2025    ALT 17 02/20/2025    AST 16 02/20/2025    GLUCOSE 96 02/20/2025    ALBUMIN 4.4 02/20/2025     Lab Results   Component Value Date    WBC 10.0 02/20/2025    HGB 12.9 02/20/2025    HCT  "39.4 02/20/2025     02/20/2025     No results found for: \"APTT\", \"INR\", \"PTT\"  Lab Results   Component Value Date    CKTOTAL 38 12/03/2024    CKTOTAL 41 01/17/2024    CKTOTAL 60 10/28/2022     No results found for: \"BNP\", \"PROBNP\"    Lab Results   Component Value Date    CHLPL 147 02/20/2025    TRIG 94 02/20/2025    HDL 46 02/20/2025    LDL 83 02/20/2025     Lab Results   Component Value Date    TSH 1.780 02/20/2025       [facility]:    ECG 12 Lead    Date/Time: 7/18/2025 1:49 PM  Performed by: Manjinder Bermeo MD    Authorized by: Manjinder Bermeo MD  Comparison: compared with previous ECG from 7/20/2024  Similar to previous ECG  Rhythm: sinus rhythm  Other findings: non-specific ST-T wave changes  Comments: Normal sinus rhythm with nonspecific ST-T changes unchanged from prior        CXR: No results found for this or any previous visit.     ECHO/MUGA: Results for orders placed in visit on 11/01/22    Adult Transthoracic Echo Complete W/ Cont if Necessary Per Protocol    Interpretation Summary    Left ventricular systolic function is normal. Left ventricular ejection fraction appears to be 56 - 60%.    Left ventricular wall thickness is consistent with mild to moderate concentric hypertrophy.    Left ventricular mass index is increased.    Left ventricular diastolic function is consistent with (grade I) impaired relaxation.    Estimated right ventricular systolic pressure from tricuspid regurgitation is mildly elevated (35-45 mmHg).    The aortic valve could not be well visualized with trivial AI    Mild Tr, trivial AI,    Normal RV size and function    No pericardial effusion     STRESS TESTS: No results found for this or any previous visit.     CARDIAC CATH: No results found for this or any previous visit.     DEVICES: No valid procedures specified.   HOLTER: No results found for this or any previous visit.     CT/MRI:  No results found for this or any previous visit.    VASCULAR: No valid procedures " specified.     Assessment and Plan  Diagnoses and all orders for this visit:    1. Essential hypertension (Primary)  Assessment & Plan:  Hypertension is stable and controlled.  Echo with normal EF, mild to moderate LVH, grade 1 diastolic dysfunction, mild pulm hypertension.  Continue current treatment regimen with amlodipine 2.5 mg, prazosin 2 mg, valsartan 320 mg.  Labs stable.  Dietary sodium restriction.  Weight loss.  Regular aerobic exercise.  Stopping cigarette smoking strongly reinforced, patient has Nicorette gums.  Ambulatory blood pressure monitoring.  Blood pressure will be reassessed in 6 months    Orders:  -     ECG 12 Lead    2. Hyperlipidemia LDL goal <70  Assessment & Plan:   Lipid abnormalities are improving with treatment    Plan:  Continue same medication/s without change.  Rosuvastatin 10 mg p.o. daily    Discussed medication dosage, use, side effects, and goals of treatment in detail.    Counseled patient on lifestyle modifications to help control hyperlipidemia.   Advised patient to exercise for 150 minutes weekly. (30 minute brisk walk, 5 days a week for example)  Stop tobacco use encouraged  Lab Results   Component Value Date    LDL 83 02/20/2025    LDL 47 02/15/2024    HDL 46 02/20/2025    HDL 42 02/15/2024    CHLPL 147 02/20/2025    CHLPL 104 02/15/2024    TRIG 94 02/20/2025    TRIG 68 02/15/2024       Patient Treatment Goals:   LDL goal is less than 70; near target    Followup in 6 months.      3. Tobacco use  Assessment & Plan:  Ms. Agustina Taylor  reports that she has been smoking cigarettes. She has a 15.5 pack-year smoking history. She has never used smokeless tobacco..  I have educated her on the risk of diseases from using tobacco products such as cancer, COPD, heart disease, and cataracts.   I advised her to quit and she is willing to quit. We have discussed the following method/s for tobacco cessation:  Counseling and OTC Cessation Products.    I spent 7 minutes counseling the  patient.       Orders:  -     nicotine (NICODERM CQ) 21 MG/24HR patch; Place 1 patch on the skin as directed by provider Daily.  Dispense: 30 patch; Refill: 2  -     nicotine polacrilex (NICORETTE) 2 MG gum; Chew 1 each As Needed for Smoking Cessation (if urge to smoke; use in addition to patch. Use every two hours up to 5 per day.). DO NOT USE AND SMOKE.  Dispense: 100 each; Refill: 1    4. History of stroke  Assessment & Plan:  In 2012.  Carotid ultrasound with no significant stenosis, mild atherosclerosis in 2022.  Patient on clopidogrel and rosuvastatin for secondary prevention, amlodipine, valsartan and prazosin for blood pressure control. Continue optimal medical therapy.  Smoking cessation strongly encouraged as per problem list.       5. PVD (peripheral vascular disease) with claudication  Assessment & Plan:  Decreased pulses left lower extremity when compared to the right lower extremity. Past VELIA normal.  Patient with significant back problems.  Significant left lower extremity claudication.  Repeat VELIA.  Aggressive secondary prevention interventions as above.  Smoking cessation.    Orders:  -     Doppler Arterial Multi Level Lower Extremity - Bilateral CAR; Future         Return in about 6 months (around 1/18/2026) for Follow-up with Dr Bermeo.    There are no Patient Instructions on file for this visit.    Patient or patient representative verbalized consent for the use of Ambient Listening during the visit with  Manjinder Bermeo MD for chart documentation. 7/18/2025  13:54 EDT    Manjinder Bermeo MD

## 2025-07-18 NOTE — ASSESSMENT & PLAN NOTE
Hypertension is stable and controlled.  Echo with normal EF, mild to moderate LVH, grade 1 diastolic dysfunction, mild pulm hypertension.  Continue current treatment regimen with amlodipine 2.5 mg, prazosin 2 mg, valsartan 320 mg.  Labs stable.  Dietary sodium restriction.  Weight loss.  Regular aerobic exercise.  Stopping cigarette smoking strongly reinforced, patient has Nicorette gums.  Ambulatory blood pressure monitoring.  Blood pressure will be reassessed in 6 months

## 2025-07-18 NOTE — ASSESSMENT & PLAN NOTE
Lipid abnormalities are improving with treatment    Plan:  Continue same medication/s without change.  Rosuvastatin 10 mg p.o. daily    Discussed medication dosage, use, side effects, and goals of treatment in detail.    Counseled patient on lifestyle modifications to help control hyperlipidemia.   Advised patient to exercise for 150 minutes weekly. (30 minute brisk walk, 5 days a week for example)  Stop tobacco use encouraged  Lab Results   Component Value Date    LDL 83 02/20/2025    LDL 47 02/15/2024    HDL 46 02/20/2025    HDL 42 02/15/2024    CHLPL 147 02/20/2025    CHLPL 104 02/15/2024    TRIG 94 02/20/2025    TRIG 68 02/15/2024       Patient Treatment Goals:   LDL goal is less than 70; near target    Followup in 6 months.

## 2025-07-18 NOTE — ASSESSMENT & PLAN NOTE
In 2012.  Carotid ultrasound with no significant stenosis, mild atherosclerosis in 2022.  Patient on clopidogrel and rosuvastatin for secondary prevention, amlodipine, valsartan and prazosin for blood pressure control. Continue optimal medical therapy.  Smoking cessation strongly encouraged as per problem list.

## 2025-07-18 NOTE — ASSESSMENT & PLAN NOTE
Decreased pulses left lower extremity when compared to the right lower extremity. Past VELIA normal.  Patient with significant back problems.  Significant left lower extremity claudication.  Repeat VELIA.  Aggressive secondary prevention interventions as above.  Smoking cessation.

## 2025-08-05 ENCOUNTER — TELEPHONE (OUTPATIENT)
Dept: CARDIOLOGY | Facility: CLINIC | Age: 55
End: 2025-08-05
Payer: COMMERCIAL

## 2025-08-05 DIAGNOSIS — Z86.73 HISTORY OF STROKE: ICD-10-CM

## 2025-08-05 DIAGNOSIS — I65.29 STENOSIS OF CAROTID ARTERY, UNSPECIFIED LATERALITY: ICD-10-CM

## 2025-08-05 RX ORDER — CLOPIDOGREL BISULFATE 75 MG/1
75 TABLET ORAL DAILY
Qty: 90 TABLET | Refills: 1 | Status: SHIPPED | OUTPATIENT
Start: 2025-08-05

## 2025-08-06 ENCOUNTER — OFFICE VISIT (OUTPATIENT)
Dept: FAMILY MEDICINE CLINIC | Facility: CLINIC | Age: 55
End: 2025-08-06
Payer: COMMERCIAL

## 2025-08-06 VITALS
BODY MASS INDEX: 25.34 KG/M2 | HEART RATE: 85 BPM | HEIGHT: 59 IN | OXYGEN SATURATION: 96 % | WEIGHT: 125.7 LBS | SYSTOLIC BLOOD PRESSURE: 128 MMHG | DIASTOLIC BLOOD PRESSURE: 64 MMHG

## 2025-08-06 DIAGNOSIS — M62.838 MUSCLE SPASM: Primary | ICD-10-CM

## 2025-08-06 DIAGNOSIS — M54.42 CHRONIC MIDLINE LOW BACK PAIN WITH LEFT-SIDED SCIATICA: ICD-10-CM

## 2025-08-06 DIAGNOSIS — G89.29 CHRONIC MIDLINE LOW BACK PAIN WITH LEFT-SIDED SCIATICA: ICD-10-CM

## 2025-08-06 PROCEDURE — 1160F RVW MEDS BY RX/DR IN RCRD: CPT | Performed by: PHYSICIAN ASSISTANT

## 2025-08-06 PROCEDURE — 3074F SYST BP LT 130 MM HG: CPT | Performed by: PHYSICIAN ASSISTANT

## 2025-08-06 PROCEDURE — 1159F MED LIST DOCD IN RCRD: CPT | Performed by: PHYSICIAN ASSISTANT

## 2025-08-06 PROCEDURE — 1125F AMNT PAIN NOTED PAIN PRSNT: CPT | Performed by: PHYSICIAN ASSISTANT

## 2025-08-06 PROCEDURE — 99214 OFFICE O/P EST MOD 30 MIN: CPT | Performed by: PHYSICIAN ASSISTANT

## 2025-08-06 PROCEDURE — 3078F DIAST BP <80 MM HG: CPT | Performed by: PHYSICIAN ASSISTANT

## 2025-08-06 RX ORDER — MIRTAZAPINE 15 MG/1
TABLET, FILM COATED ORAL NIGHTLY
COMMUNITY

## 2025-08-06 RX ORDER — METHOCARBAMOL 500 MG/1
500 TABLET, FILM COATED ORAL NIGHTLY
Qty: 90 TABLET | Refills: 1 | Status: SHIPPED | OUTPATIENT
Start: 2025-08-06

## 2025-08-06 RX ORDER — HYDROXYZINE PAMOATE 50 MG/1
CAPSULE ORAL 3 TIMES DAILY PRN
COMMUNITY

## 2025-08-06 RX ORDER — METHYLPREDNISOLONE 4 MG/1
TABLET ORAL
Qty: 21 TABLET | Refills: 0 | Status: SHIPPED | OUTPATIENT
Start: 2025-08-06

## 2025-08-16 ENCOUNTER — RESULTS FOLLOW-UP (OUTPATIENT)
Dept: CARDIOLOGY | Facility: CLINIC | Age: 55
End: 2025-08-16
Payer: COMMERCIAL

## 2025-08-20 ENCOUNTER — TELEPHONE (OUTPATIENT)
Dept: FAMILY MEDICINE CLINIC | Facility: CLINIC | Age: 55
End: 2025-08-20

## 2025-08-20 ENCOUNTER — OFFICE VISIT (OUTPATIENT)
Dept: FAMILY MEDICINE CLINIC | Facility: CLINIC | Age: 55
End: 2025-08-20
Payer: COMMERCIAL

## 2025-08-20 VITALS
HEART RATE: 91 BPM | WEIGHT: 127.1 LBS | HEIGHT: 59 IN | DIASTOLIC BLOOD PRESSURE: 90 MMHG | BODY MASS INDEX: 25.62 KG/M2 | SYSTOLIC BLOOD PRESSURE: 136 MMHG | OXYGEN SATURATION: 95 %

## 2025-08-20 DIAGNOSIS — I10 ESSENTIAL HYPERTENSION: Primary | ICD-10-CM

## 2025-08-20 DIAGNOSIS — K76.9 LIVER LESION: ICD-10-CM

## 2025-08-20 DIAGNOSIS — J44.9 CHRONIC OBSTRUCTIVE PULMONARY DISEASE, UNSPECIFIED COPD TYPE: ICD-10-CM

## 2025-08-20 DIAGNOSIS — R20.2 NUMBNESS AND TINGLING OF LEFT ARM AND LEG: ICD-10-CM

## 2025-08-20 DIAGNOSIS — M54.6 ACUTE MIDLINE THORACIC BACK PAIN: ICD-10-CM

## 2025-08-20 DIAGNOSIS — R20.0 NUMBNESS AND TINGLING OF LEFT ARM AND LEG: ICD-10-CM

## 2025-08-20 PROBLEM — B18.2 CHRONIC HEPATITIS C WITHOUT HEPATIC COMA: Status: ACTIVE | Noted: 2025-08-20

## 2025-08-20 PROBLEM — K21.9 GASTROESOPHAGEAL REFLUX DISEASE: Status: ACTIVE | Noted: 2025-08-20

## 2025-08-20 PROBLEM — J01.00 ACUTE NON-RECURRENT MAXILLARY SINUSITIS: Status: RESOLVED | Noted: 2025-02-20 | Resolved: 2025-08-20

## 2025-08-20 PROBLEM — R76.8 POSITIVE ANA (ANTINUCLEAR ANTIBODY): Status: ACTIVE | Noted: 2025-08-20

## 2025-08-20 PROCEDURE — 1125F AMNT PAIN NOTED PAIN PRSNT: CPT | Performed by: PHYSICIAN ASSISTANT

## 2025-08-20 PROCEDURE — 3075F SYST BP GE 130 - 139MM HG: CPT | Performed by: PHYSICIAN ASSISTANT

## 2025-08-20 PROCEDURE — 1159F MED LIST DOCD IN RCRD: CPT | Performed by: PHYSICIAN ASSISTANT

## 2025-08-20 PROCEDURE — 1160F RVW MEDS BY RX/DR IN RCRD: CPT | Performed by: PHYSICIAN ASSISTANT

## 2025-08-20 PROCEDURE — 3080F DIAST BP >= 90 MM HG: CPT | Performed by: PHYSICIAN ASSISTANT

## 2025-08-20 PROCEDURE — 99214 OFFICE O/P EST MOD 30 MIN: CPT | Performed by: PHYSICIAN ASSISTANT

## 2025-08-20 RX ORDER — AMLODIPINE BESYLATE 2.5 MG/1
5 TABLET ORAL DAILY
Qty: 90 TABLET | Refills: 1 | Status: SHIPPED | OUTPATIENT
Start: 2025-08-20 | End: 2025-08-20 | Stop reason: SDUPTHER

## 2025-08-20 RX ORDER — AMLODIPINE BESYLATE 5 MG/1
5 TABLET ORAL DAILY
Qty: 90 TABLET | Refills: 1 | Status: SHIPPED | OUTPATIENT
Start: 2025-08-20

## 2025-08-20 RX ORDER — BENZONATATE 100 MG/1
100 CAPSULE ORAL 3 TIMES DAILY PRN
Qty: 30 CAPSULE | Refills: 1 | Status: SHIPPED | OUTPATIENT
Start: 2025-08-20

## 2025-08-20 RX ORDER — TIOTROPIUM BROMIDE 18 UG/1
1 CAPSULE ORAL; RESPIRATORY (INHALATION)
Qty: 90 CAPSULE | Refills: 1 | Status: SHIPPED | OUTPATIENT
Start: 2025-08-20

## 2025-08-26 ENCOUNTER — RESULTS FOLLOW-UP (OUTPATIENT)
Dept: FAMILY MEDICINE CLINIC | Facility: CLINIC | Age: 55
End: 2025-08-26
Payer: COMMERCIAL